# Patient Record
Sex: MALE | Race: BLACK OR AFRICAN AMERICAN | Employment: OTHER | URBAN - METROPOLITAN AREA
[De-identification: names, ages, dates, MRNs, and addresses within clinical notes are randomized per-mention and may not be internally consistent; named-entity substitution may affect disease eponyms.]

---

## 2017-02-06 ENCOUNTER — OFFICE VISIT (OUTPATIENT)
Dept: INTERNAL MEDICINE CLINIC | Age: 62
End: 2017-02-06

## 2017-02-06 VITALS
WEIGHT: 158 LBS | RESPIRATION RATE: 16 BRPM | BODY MASS INDEX: 22.62 KG/M2 | TEMPERATURE: 98.4 F | OXYGEN SATURATION: 98 % | HEIGHT: 70 IN | SYSTOLIC BLOOD PRESSURE: 144 MMHG | DIASTOLIC BLOOD PRESSURE: 94 MMHG | HEART RATE: 118 BPM

## 2017-02-06 DIAGNOSIS — R06.02 SOBOE (SHORTNESS OF BREATH ON EXERTION): ICD-10-CM

## 2017-02-06 DIAGNOSIS — J45.20 ASTHMA, MILD INTERMITTENT, WELL-CONTROLLED: ICD-10-CM

## 2017-02-06 DIAGNOSIS — Z71.89 ADVANCED CARE PLANNING/COUNSELING DISCUSSION: ICD-10-CM

## 2017-02-06 DIAGNOSIS — Z91.14 NONCOMPLIANCE WITH MEDICATION REGIMEN: ICD-10-CM

## 2017-02-06 DIAGNOSIS — I10 UNCONTROLLED HYPERTENSION: Primary | ICD-10-CM

## 2017-02-06 RX ORDER — ALBUTEROL SULFATE 90 UG/1
2 AEROSOL, METERED RESPIRATORY (INHALATION)
Qty: 1 INHALER | Refills: 0 | Status: SHIPPED | OUTPATIENT
Start: 2017-02-06 | End: 2017-10-12 | Stop reason: SDUPTHER

## 2017-02-06 RX ORDER — METOPROLOL TARTRATE 50 MG/1
TABLET ORAL
Qty: 60 TAB | Refills: 3 | Status: SHIPPED | OUTPATIENT
Start: 2017-02-06 | End: 2017-05-24 | Stop reason: SDUPTHER

## 2017-02-06 RX ORDER — AMLODIPINE BESYLATE 5 MG/1
TABLET ORAL
Qty: 30 TAB | Refills: 3 | Status: SHIPPED | OUTPATIENT
Start: 2017-02-06 | End: 2017-05-24 | Stop reason: SDUPTHER

## 2017-02-06 RX ORDER — LEVETIRACETAM 500 MG/1
TABLET ORAL
Qty: 60 TAB | Refills: 2 | Status: SHIPPED | OUTPATIENT
Start: 2017-02-06 | End: 2017-04-25 | Stop reason: SDUPTHER

## 2017-02-06 NOTE — PROGRESS NOTES
Chief Complaint   Patient presents with    Annual Wellness Visit       Subjective:   Amy Ordoñez 59 y.o.  male with a  past medical history reviewed see below. Here c/o breathing concerns and to recheck bp was booked for [address] - NOT DUE  Celester Shone like  an inhaler refill using it once or twice a day no coughing mild soboe  no cp mild coughing no fever or chills no bd pain   Knee pain is improved and swelling is better. ROS: otherwise feeling generally well. All other systems reviewed and are negative      Current Outpatient Prescriptions   Medication Sig Dispense Refill    albuterol (PROAIR HFA) 90 mcg/actuation inhaler Take 2 Puffs by inhalation every four (4) hours as needed for Wheezing or Shortness of Breath. 1 Inhaler 0    levETIRAcetam (KEPPRA) 500 mg tablet TAKE ONE TABLET BY MOUTH TWICE DAILY 60 Tab 2    amLODIPine (NORVASC) 5 mg tablet TAKE ONE TABLET BY MOUTH EVERY DAY 30 Tab 3    metoprolol tartrate (LOPRESSOR) 50 mg tablet TAKE ONE TABLET BY MOUTH TWICE DAILY. 60 Tab 3    traZODone (DESYREL) 100 mg tablet TAKE ONE TABLET BY MOUTH ONCE DAILY AT BEDTIME 30 Tab 0    therapeutic multivitamin (THERAGRAN) tablet Take 1 Tab by mouth daily.  30 Tab 3     Allergies   Allergen Reactions    Other Medication Hives     Ketchup(cheap brands) causes hives     Past Medical History:   Diagnosis Date    Depression     Hypertension      Past Surgical History:   Procedure Laterality Date    HX COLONOSCOPY  at age 44     Family History   Problem Relation Age of Onset    Alcohol abuse Mother     Alcohol abuse Father     Hypertension Sister      Social History   Substance Use Topics    Smoking status: Never Smoker    Smokeless tobacco: Never Used    Alcohol use 1.2 oz/week     2 Cans of beer per week      Comment: 6-8 cans of Lite beer per week          Objective:     Visit Vitals    BP (!) 144/94    Pulse (!) 118    Temp 98.4 °F (36.9 °C) (Oral)    Resp 16    Ht 5' 10\" (1.778 m)    Wt 158 lb (71.7 kg)  SpO2 98%    BMI 22.67 kg/m2     Gen: NAD, pleasant  HEENT: normal appearing head, nares patent, PERRLA, EOMI, oropharynx no erythema, no cervical lymphadenopathy neck supple   Cardio: RRR nl S1S2 no murmur  Lungs CTAB no wheeze no rales no rhonchi  ABD Soft non tender non distended + bowel sounds  Extremities: full ROM X 4 no clubbing no cyanosis  Neuro: no gross focal deficits noted, alert and orientated X 3  Psych.: well groomed no outward signs of depression. Assessment/Plan:   Nicole Chapman was seen today for annual wellness visit. Diagnoses and all orders for this visit:    Uncontrolled hypertension    Asthma, mild intermittent, well-controlled    Noncompliance with medication regimen    SOBOE (shortness of breath on exertion)    Advanced care planning/counseling discussion    Other orders  -     albuterol (PROAIR HFA) 90 mcg/actuation inhaler; Take 2 Puffs by inhalation every four (4) hours as needed for Wheezing or Shortness of Breath. -     levETIRAcetam (KEPPRA) 500 mg tablet; TAKE ONE TABLET BY MOUTH TWICE DAILY  -     amLODIPine (NORVASC) 5 mg tablet; TAKE ONE TABLET BY MOUTH EVERY DAY  -     metoprolol tartrate (LOPRESSOR) 50 mg tablet; TAKE ONE TABLET BY MOUTH TWICE DAILY. Follow-up Disposition:  Return in about 1 month (around 3/6/2017) for recheck BP 15 min . Prisca rodriguez printed and given to the pt. .    The patient voiced understanding of the above. Medication side effects were reviewed with the patient. Call with any concerns.                Advance Care Planning:   Patient was offered the opportunity to discuss advance care planning:  yes     Does patient have an Advance Directive:  no   Honoring choices discussed

## 2017-02-06 NOTE — MR AVS SNAPSHOT
Visit Information Date & Time Provider Department Dept. Phone Encounter #  
 2/6/2017  1:45 PM Vasquez Childress, [address] [facility] [phone] [id number] Follow-up Instructions Return in about 1 month (around 3/6/2017) for recheck BP 15 min . Upcoming Health Maintenance Date Due COLONOSCOPY 6/28/1971 Pneumococcal 19-64 Medium Risk (1 of 1 - PPSV23) 6/28/1972 DTaP/Tdap/Td series (1 - Tdap) 6/28/1974 ZOSTER VACCINE AGE 60> 6/28/2013 INFLUENZA AGE 9 TO ADULT 8/1/2016 MEDICARE YEARLY EXAM 8/6/2017 Allergies as of 2/6/2017  Review Complete On: 2/6/2017 By: She Velazquez LPN Severity Noted Reaction Type Reactions Other Medication  08/10/2011    Hives Ketchup(cheap brands) causes hives Current Immunizations  Reviewed on 8/5/2016 No immunizations on file. Not reviewed this visit You Were Diagnosed With   
  
 Codes Comments Uncontrolled hypertension    -  Primary ICD-10-CM: I10 
ICD-9-CM: 401.9 Asthma, mild intermittent, well-controlled     ICD-10-CM: J45.20 ICD-9-CM: 493.90 Noncompliance with medication regimen     ICD-10-CM: Z91.14 
ICD-9-CM: V15.81 SOBOE (shortness of breath on exertion)     ICD-10-CM: R06.02 
ICD-9-CM: 786.05 Advanced care planning/counseling discussion     ICD-10-CM: Z71.89 ICD-9-CM: V65.49 Vitals BP Pulse Temp Resp Height(growth percentile) Weight(growth percentile) (!) 144/94 (!) 118 98.4 °F (36.9 °C) (Oral) 16 5' 10\" (1.778 m) 158 lb (71.7 kg) SpO2 BMI Smoking Status 98% 22.67 kg/m2 Never Smoker BMI and BSA Data Body Mass Index Body Surface Area  
 22.67 kg/m 2 1.88 m 2 Preferred Pharmacy Pharmacy Name Phone [facility] [address], [address] [place] [address] [address]. [phone] Your Updated Medication List  
  
   
This list is accurate as of: 2/6/17  2:15 PM.  Always use your most recent med list.  
  
  
  
  
 albuterol 90 mcg/actuation inhaler Commonly known as:  PROAIR HFA Take 2 Puffs by inhalation every four (4) hours as needed for Wheezing or Shortness of Breath. amLODIPine 5 mg tablet Commonly known as:  [place] [facility] TAKE ONE TABLET BY MOUTH EVERY DAY  
  
 levETIRAcetam 500 mg tablet Commonly known as:  KEPPRA TAKE ONE TABLET BY MOUTH TWICE DAILY  
  
 metoprolol tartrate 50 mg tablet Commonly known as:  LOPRESSOR  
TAKE ONE TABLET BY MOUTH TWICE DAILY. therapeutic multivitamin tablet Commonly known as:  [facility] Take 1 Tab by mouth daily. traZODone 100 mg tablet Commonly known as:  DESYREL  
TAKE ONE TABLET BY MOUTH ONCE DAILY AT BEDTIME Prescriptions Sent to Pharmacy Refills  
 albuterol (PROAIR HFA) 90 mcg/actuation inhaler 0 Sig: Take 2 Puffs by inhalation every four (4) hours as needed for Wheezing or Shortness of Breath. Class: Normal  
 Pharmacy: [address] [place], [place] - [address] Ph #: [phone] Route: Inhalation  
 levETIRAcetam (KEPPRA) 500 mg tablet 2 Sig: TAKE ONE TABLET BY MOUTH TWICE DAILY Class: Normal  
 Pharmacy: [facility] [place], [place] - [address] Ph #: [phone]  
 amLODIPine (NORVASC) 5 mg tablet 3 Sig: TAKE ONE TABLET BY MOUTH EVERY DAY Class: Normal  
 Pharmacy: [address] [place], [place] - [address] Ph #: [phone]  
 metoprolol tartrate (LOPRESSOR) 50 mg tablet 3 Sig: TAKE ONE TABLET BY MOUTH TWICE DAILY. Class: Normal  
 Pharmacy: [address] [place], [place] - [address] Ph #: [phone] Follow-up Instructions Return in about 1 month (around 3/6/2017) for recheck BP 15 min . Introducing [facility]! Charo Harrison introduces [employer] patient portal. Now you can access parts of your medical record, email your doctor's office, and request medication refills online. 1. In your internet browser, go to [email or web address] 2. Click on the First Time User? Click Here link in the Sign In box. You will see the New Member Sign Up page. 3. Enter your [employer] Access Code exactly as it appears below. You will not need to use this code after youve completed the sign-up process. If you do not sign up before the expiration date, you must request a new code. · [employer] Access Code: [email or web address] Expires: 5/7/2017  2:15 PM 
 
4. Enter the last four digits of your Social Security Number (xxxx) and Date of Birth (mm/dd/yyyy) as indicated and click Submit. You will be taken to the next sign-up page. 5. Create a Jamgot ID. This will be your [employer] login ID and cannot be changed, so think of one that is secure and easy to remember. 6. Create a [employer] password. You can change your password at any time. 7. Enter your Password Reset Question and Answer. This can be used at a later time if you forget your password. 8. Enter your e-mail address. You will receive e-mail notification when new information is available in [address] 9. Click Sign Up. You can now view and download portions of your medical record. 10. Click the Download Summary menu link to download a portable copy of your medical information. If you have questions, please visit the Frequently Asked Questions section of the [employer] website. Remember, [employer] is NOT to be used for urgent needs. For medical emergencies, dial 911. Now available from your iPhone and Android! Please provide this summary of care documentation to your next provider. Your primary care clinician is listed as Gloria Schultz. If you have any questions after today's visit, please call [phone].

## 2017-02-06 NOTE — ACP (ADVANCE CARE PLANNING)
I met with the patient discuss advanced medical directives (AMD). The meeting was held at my  office. I discussed the advantages to completing the AMD through a facilitated discussion with his chosen healthcare agent as well as criteria to consider when selecting an agent. The patient was agreeable to receiving and reviewing Honoring Choices written information. And is planning to discuss advanced directives during the next office visit . Handouts given to pt on honoring choices tube feeding cpr ventilation support and how to choose a health care agent. Would likely be his sister garett armstrong    Time spent with pt   6 min     Denia Castro M.D.   Family Medicine -honoring choice facilitator

## 2017-05-09 RX ORDER — ALBUTEROL SULFATE 90 UG/1
AEROSOL, METERED RESPIRATORY (INHALATION)
OUTPATIENT
Start: 2017-05-09

## 2017-05-10 RX ORDER — LEVETIRACETAM 500 MG/1
TABLET ORAL
Qty: 60 TAB | Refills: 3 | Status: SHIPPED | OUTPATIENT
Start: 2017-05-10 | End: 2017-10-12 | Stop reason: SDUPTHER

## 2017-05-31 RX ORDER — METOPROLOL TARTRATE 50 MG/1
TABLET ORAL
Qty: 60 TAB | Refills: 3 | Status: SHIPPED | OUTPATIENT
Start: 2017-05-31 | End: 2017-10-12 | Stop reason: SDUPTHER

## 2017-05-31 RX ORDER — AMLODIPINE BESYLATE 5 MG/1
TABLET ORAL
Qty: 30 TAB | Refills: 3 | Status: SHIPPED | OUTPATIENT
Start: 2017-05-31 | End: 2017-10-12 | Stop reason: SDUPTHER

## 2017-07-07 ENCOUNTER — PATIENT OUTREACH (OUTPATIENT)
Dept: INTERNAL MEDICINE CLINIC | Age: 62
End: 2017-07-07

## 2017-10-12 ENCOUNTER — OFFICE VISIT (OUTPATIENT)
Dept: INTERNAL MEDICINE CLINIC | Age: 62
End: 2017-10-12

## 2017-10-12 VITALS
HEART RATE: 63 BPM | TEMPERATURE: 97.9 F | SYSTOLIC BLOOD PRESSURE: 138 MMHG | RESPIRATION RATE: 18 BRPM | WEIGHT: 151.4 LBS | HEIGHT: 70 IN | OXYGEN SATURATION: 97 % | DIASTOLIC BLOOD PRESSURE: 84 MMHG | BODY MASS INDEX: 21.67 KG/M2

## 2017-10-12 DIAGNOSIS — J45.20 ASTHMA, MILD INTERMITTENT, WELL-CONTROLLED: ICD-10-CM

## 2017-10-12 DIAGNOSIS — F10.10 ETOH ABUSE: ICD-10-CM

## 2017-10-12 DIAGNOSIS — I10 ESSENTIAL HYPERTENSION: ICD-10-CM

## 2017-10-12 DIAGNOSIS — J45.909 MILD ASTHMA WITHOUT COMPLICATION, UNSPECIFIED WHETHER PERSISTENT: ICD-10-CM

## 2017-10-12 DIAGNOSIS — R56.9 CONVULSIONS, UNSPECIFIED CONVULSION TYPE (HCC): ICD-10-CM

## 2017-10-12 DIAGNOSIS — R35.1 NOCTURIA: ICD-10-CM

## 2017-10-12 DIAGNOSIS — I49.3 PVC (PREMATURE VENTRICULAR CONTRACTION): ICD-10-CM

## 2017-10-12 DIAGNOSIS — Z13.31 SCREENING FOR DEPRESSION: ICD-10-CM

## 2017-10-12 DIAGNOSIS — R25.1 TREMORS OF NERVOUS SYSTEM: ICD-10-CM

## 2017-10-12 DIAGNOSIS — Z13.39 SCREENING FOR ALCOHOLISM: ICD-10-CM

## 2017-10-12 DIAGNOSIS — Z00.00 MEDICARE ANNUAL WELLNESS VISIT, SUBSEQUENT: Primary | ICD-10-CM

## 2017-10-12 DIAGNOSIS — R79.9 ABNORMAL FINDING OF BLOOD CHEMISTRY: ICD-10-CM

## 2017-10-12 RX ORDER — TRAZODONE HYDROCHLORIDE 100 MG/1
100 TABLET ORAL
Qty: 30 TAB | Refills: 11 | Status: SHIPPED | OUTPATIENT
Start: 2017-10-12 | End: 2018-10-05 | Stop reason: SDUPTHER

## 2017-10-12 RX ORDER — AMLODIPINE BESYLATE 5 MG/1
5 TABLET ORAL DAILY
Qty: 30 TAB | Refills: 11 | Status: SHIPPED | OUTPATIENT
Start: 2017-10-12 | End: 2018-10-05 | Stop reason: SDUPTHER

## 2017-10-12 RX ORDER — METOPROLOL TARTRATE 50 MG/1
50 TABLET ORAL 2 TIMES DAILY
Qty: 60 TAB | Refills: 11 | Status: SHIPPED | OUTPATIENT
Start: 2017-10-12 | End: 2018-10-05 | Stop reason: SDUPTHER

## 2017-10-12 RX ORDER — ALBUTEROL SULFATE 90 UG/1
2 AEROSOL, METERED RESPIRATORY (INHALATION)
Qty: 1 INHALER | Refills: 11 | Status: SHIPPED | OUTPATIENT
Start: 2017-10-12 | End: 2018-11-26 | Stop reason: SDUPTHER

## 2017-10-12 RX ORDER — LEVETIRACETAM 500 MG/1
500 TABLET ORAL 2 TIMES DAILY
Qty: 60 TAB | Refills: 11 | Status: SHIPPED | OUTPATIENT
Start: 2017-10-12 | End: 2018-11-26 | Stop reason: SDUPTHER

## 2017-10-12 NOTE — PROGRESS NOTES
1. Have you been to the ER, urgent care clinic since your last visit? Hospitalized since your last visit? No    2. Have you seen or consulted any other health care providers outside of the [address] [place] [address] since your last visit? Include any pap smears or colon screening. No    This is a Subsequent Medicare Annual Wellness Exam (AWV) (Performed 12 months after IPPE or effective date of Medicare Part B enrollment, Once in a lifetime)    I have reviewed the patient's medical history in detail and updated the computerized patient record. History     Past Medical History:   Diagnosis Date    Depression     Hypertension       Past Surgical History:   Procedure Laterality Date    HX COLONOSCOPY  at age 44     Current Outpatient Prescriptions   Medication Sig Dispense Refill    metoprolol tartrate (LOPRESSOR) 50 mg tablet Take 1 Tab by mouth two (2) times a day. 60 Tab 11    amLODIPine (NORVASC) 5 mg tablet Take 1 Tab by mouth daily. 30 Tab 11    levETIRAcetam (KEPPRA) 500 mg tablet Take 1 Tab by mouth two (2) times a day. 60 Tab 11    albuterol (PROAIR HFA) 90 mcg/actuation inhaler Take 2 Puffs by inhalation every four (4) hours as needed for Wheezing or Shortness of Breath. 1 Inhaler 11    traZODone (DESYREL) 100 mg tablet Take 1 Tab by mouth nightly. 30 Tab 11    therapeutic multivitamin (THERAGRAN) tablet Take 1 Tab by mouth daily.  30 Tab 3     Allergies   Allergen Reactions    Other Medication Hives     Ketchup(cheap brands) causes hives     Family History   Problem Relation Age of Onset    Alcohol abuse Mother     Alcohol abuse Father     Hypertension Sister      Social History   Substance Use Topics    Smoking status: Never Smoker    Smokeless tobacco: Never Used    Alcohol use 1.2 oz/week     2 Cans of beer per week      Comment: 6-8 cans of Lite beer per week     Patient Active Problem List   Diagnosis Code    Tremors of nervous system R25.1    ETOH abuse F10.10    Asthma J45.909  Benign essential HTN I10    Convulsions ([facility] [address] R56.9    Anemia, unspecified D64.9    Unspecified dementia without behavioral disturbance F03.90    Mild intermittent asthma without complication W41.86    Advance directive discussed with patient Z70.80    Advanced care planning/counseling discussion Z71.89    Asthma, mild intermittent, well-controlled J45.20    Hypertension I10    Depression F32.9       Depression Risk Factor Screening:     PHQ over the last two weeks 10/12/2017   Little interest or pleasure in doing things Not at all   Feeling down, depressed or hopeless Not at all   Total Score PHQ 2 0     Alcohol Risk Factor Screening: You do not drink alcohol or very rarely. On any occasion in the past three months you have had more than 7 drinks containing alcohol      Functional Ability and Level of Safety:   Hearing Loss  Hearing is good. Activities of Daily Living  The home contains: no safety equipment. Patient does total self care    Fall RiskNo flowsheet data found. Abuse Screen  Patient is not abused    Cognitive Screening   Evaluation of Cognitive Function:  Has your family/caregiver stated any concerns about your memory: no  Normal    Patient Care Team   Patient Care Team:  Jef Rivera MD as PCP - General (Family Practice)  Sandeep Jones LPN as Nurse Navigator  Zaheer Del Valle RN as Nurse Navigator    Assessment/Plan   Education and counseling provided:  Are appropriate based on today's review and evaluation    Diagnoses and all orders for this visit:    1. Medicare annual wellness visit, subsequent    2. Screening for alcoholism  -     Annual  Alcohol Screen 15 min ()    3. Screening for depression  -     Depression Screen Annual    4.  Essential hypertension  -     URINALYSIS W/ RFLX MICROSCOPIC  -     CBC WITH AUTOMATED DIFF  -     METABOLIC PANEL, COMPREHENSIVE  -     LIPID PANEL  -     PROSTATE SPECIFIC AG  -     AK COLLECTION VENOUS BLOOD,VENIPUNCTURE  - HEMOGLOBIN A1C WITH EAG  -     AMMONIA  -     AMB POC EKG ROUTINE W/ 12 LEADS, INTER & REP  -     OCCULT BLOOD, IMMUNOASSAY (FIT)    5. Convulsions, unspecified convulsion type ([address]    6. Asthma, mild intermittent, well-controlled    7. Mild asthma without complication, unspecified whether persistent    8. Tremors of nervous system    9. ETOH abuse  -     AMMONIA    10. Nocturia   -     PROSTATE SPECIFIC AG    11. Abnormal finding of blood chemistry   -     HEMOGLOBIN A1C WITH EAG    Other orders  -     metoprolol tartrate (LOPRESSOR) 50 mg tablet; Take 1 Tab by mouth two (2) times a day. -     amLODIPine (NORVASC) 5 mg tablet; Take 1 Tab by mouth daily. -     levETIRAcetam (KEPPRA) 500 mg tablet; Take 1 Tab by mouth two (2) times a day. -     albuterol (PROAIR HFA) 90 mcg/actuation inhaler; Take 2 Puffs by inhalation every four (4) hours as needed for Wheezing or Shortness of Breath. -     traZODone (DESYREL) 100 mg tablet; Take 1 Tab by mouth nightly. Advance Care Planning (ACP) Provider Conversation Snapshot    Date of ACP Conversation: 10/12/17  Persons included in Conversation:  patient  Length of ACP Conversation in minutes:  <16 minutes (Non-Billable)    Authorized Decision Maker (if patient is incapable of making informed decisions):    This person is:   Healthcare Agent/Medical Power of  under Advance Directive  Grace Shelby        For Patients with Decision Making Capacity:   Values/Goals: Exploration of values, goals, and preferences if recovery is not expected, even with continued medical treatment in the event of:  Imminent death  Severe, permanent brain injury    Conversation Outcomes / Follow-Up Plan:   Recommended completion of Advance Directive form after review of ACP materials and conversation with prospective healthcare agent     [address], [place] [address] [place] [address],[address] [address]  Phone:  [phone]  Fax: [phone]    [facility] Complaint   Patient presents with    Annual Wellness Visit       SUBJECTIVE:    Terry Dasilva is a 57 y.o. male Patient is alert, appropriate, ambulatory and has the capacity to give an accurate history. He comes in with his sister, Betsey Essex, whose cell number is [phone]. He wants a check of his blood pressure and complete his annual Medicare exam.  Other specific complaints denied. Patient is seen for evaluation. Current Outpatient Prescriptions   Medication Sig Dispense Refill    metoprolol tartrate (LOPRESSOR) 50 mg tablet Take 1 Tab by mouth two (2) times a day. 60 Tab 11    amLODIPine (NORVASC) 5 mg tablet Take 1 Tab by mouth daily. 30 Tab 11    levETIRAcetam (KEPPRA) 500 mg tablet Take 1 Tab by mouth two (2) times a day. 60 Tab 11    albuterol (PROAIR HFA) 90 mcg/actuation inhaler Take 2 Puffs by inhalation every four (4) hours as needed for Wheezing or Shortness of Breath. 1 Inhaler 11    traZODone (DESYREL) 100 mg tablet Take 1 Tab by mouth nightly. 30 Tab 11    therapeutic multivitamin (THERAGRAN) tablet Take 1 Tab by mouth daily.  30 Tab 3     Past Medical History:   Diagnosis Date    Depression     Hypertension      Past Surgical History:   Procedure Laterality Date    HX COLONOSCOPY  at age 44     Allergies   Allergen Reactions    Other Medication Hives     Ketchup(cheap brands) causes hives       REVIEW OF SYSTEMS:  General: negative for - chills or fever  ENT: negative for - headaches, nasal congestion or tinnitus  Respiratory: negative for - cough, hemoptysis, shortness of breath or wheezing  Cardiovascular : negative for - chest pain, edema, palpitations or shortness of breath  Gastrointestinal: negative for - abdominal pain, blood in stools, heartburn or nausea/vomiting  Genito-Urinary: no dysuria, trouble voiding, or hematuria  Musculoskeletal: negative for - gait disturbance, joint pain, joint stiffness or joint swelling  Neurological: no TIA or stroke symptoms  Hematologic: no bruises, no bleeding, no swollen glands  Integument: no lumps, mole changes, nail changes or rash  Endocrine:no malaise/lethargy or unexpected weight changes      Social History     Social History    Marital status: SINGLE     Spouse name: N/A    Number of children: 1    Years of education: N/A     Occupational History          on disbiltiy arthritis and depression      Social History Main Topics    Smoking status: Never Smoker    Smokeless tobacco: Never Used    Alcohol use 1.2 oz/week     2 Cans of beer per week      Comment: 6-8 cans of Lite beer per week    Drug use: No      Comment: denies     Sexual activity: Yes     Partners: Female     Birth control/ protection: Condom, None      Comment: sister is CHRISTEN Woodruff [phone]     Other Topics Concern    None     Social History Narrative     Family History   Problem Relation Age of Onset    Alcohol abuse Mother     Alcohol abuse Father     Hypertension Sister    Habits:  He states he only drinks one to two beers a day, however his sister cannot confirm that. She states he has gin plus a 20 ounce beer on a regular basis. The records reflect a long history of alcoholism. He denies cigarette abuse and drug abuse. Social History:  The patient states with his brother with [facility] checking on both of them and making sure he takes his medications and gets his appointments. He completed the 12th grade. He is disabled due to depression since . He has a 39year old daughter and three grandchildren. The patient is single. He previously worked in the kitchen at [employer].    Family History:  Father  in his 45s of alcohol abuse. Mother  in her 62s of alcohol abuse. Eight siblings are alive and well. One sister  of alcohol and its complications.         OBJECTIVE:     Visit Vitals    /84    Pulse 63    Temp 97.9 °F (36.6 °C) (Oral)    Resp 18    Ht 5' 10\" (1.778 m)    Wt 151 lb 6.4 oz (68.7 kg)    SpO2 97%    BMI 21.72 kg/m2     CONSTITUTIONAL: well , well nourished, appears age appropriate  EYES: perrla, eom intact  ENMT:moist mucous membranes, pharynx clear  NECK: supple. Thyroid normal  RESPIRATORY: Chest: clear bilaterally  CARDIOVASCULAR: Heart: regular rate and rhythm  GASTROINTESTINAL: Abdomen: soft, bowel sounds active  HEMATOLOGIC: no pathological lymph nodes palpated  MUSCULOSKELETAL: Extremities: no edema, pulse 1+   INTEGUMENT: No unusual rashes or suspicious skin lesions noted. Nails appear normal.  NEUROLOGIC: non-focal exam   MENTAL STATUS: alert and oriented, appropriate affect     No visits with results within 3 Month(s) from this visit. Latest known visit with results is:    Office Visit on 08/05/2016   Component Date Value Ref Range Status    Hepatitis A Ab, IgM 08/05/2016 Negative  Negative Final    Hep B surface Ag screen 08/05/2016 Negative  Negative Final    Hep B Core Ab, IgM 08/05/2016 Negative  Negative Final    Hep C Virus Ab 08/05/2016 <0.1  0.0 - 0.9 s/co ratio Final    Comment:                                   Negative:     < 0.8                               Indeterminate: 0.8 - 0.9                                    Positive:     > 0.9   The CDC recommends that a positive HCV antibody result   be followed up with a HCV Nucleic Acid Amplification   test (186966).       TIBC 08/05/2016 271  250 - 450 ug/dL Final    UIBC 08/05/2016 149  111 - 343 ug/dL Final    Iron 08/05/2016 122  38 - 169 ug/dL Final    Iron % saturation 08/05/2016 45  15 - 55 % Final    WBC 08/05/2016 3.8  3.4 - 10.8 x10E3/uL Final    RBC 08/05/2016 3.72* 4.14 - 5.80 x10E6/uL Final    HGB 08/05/2016 11.2* 12.6 - 17.7 g/dL Final    HCT 08/05/2016 34.1* 37.5 - 51.0 % Final    MCV 08/05/2016 92  79 - 97 fL Final    MCH 08/05/2016 30.1  26.6 - 33.0 pg Final    MCHC 08/05/2016 32.8  31.5 - 35.7 g/dL Final    RDW 08/05/2016 15.7* 12.3 - 15.4 % Final    PLATELET 26/66/8030 334  150 - 379 x10E3/uL Final    NEUTROPHILS 08/05/2016 37  % Final    Lymphocytes 08/05/2016 43  % Final    MONOCYTES 08/05/2016 14  % Final    EOSINOPHILS 08/05/2016 5  % Final    BASOPHILS 08/05/2016 1  % Final    ABS. NEUTROPHILS 08/05/2016 1.4  1.4 - 7.0 x10E3/uL Final    Abs Lymphocytes 08/05/2016 1.6  0.7 - 3.1 x10E3/uL Final    ABS. MONOCYTES 08/05/2016 0.5  0.1 - 0.9 x10E3/uL Final    ABS. EOSINOPHILS 08/05/2016 0.2  0.0 - 0.4 x10E3/uL Final    ABS. BASOPHILS 08/05/2016 0.1  0.0 - 0.2 x10E3/uL Final    IMMATURE GRANULOCYTES 08/05/2016 0  % Final    ABS. IMM. GRANS. 08/05/2016 0.0  0.0 - 0.1 x10E3/uL Final    Reticulocyte count 08/05/2016 0.9  0.6 - 2.6 % Final    VITAMIN D, 25-HYDROXY 08/05/2016 26.2* 30.0 - 100.0 ng/mL Final    Comment: Vitamin D deficiency has been defined by the [facility] practice guideline as a  level of serum 25-OH vitamin D less than 20 ng/mL (1,2). The Endocrine Society went on to further define vitamin D  insufficiency as a level between 21 and 29 ng/mL (2). 1. IOM ([employer]). 2010. Dietary reference     intakes for calcium and D. [address] [facility]: The     [employer] 2. Gaurav MF, Miguel A NC, Daniel KITCHEN, et al.     Evaluation, treatment, and prevention of vitamin D     deficiency: an Endocrine Society clinical practice     guideline. JCEM. 2011 Jul; 96(7):1911-30.       Glucose 08/05/2016 84  65 - 99 mg/dL Final    BUN 08/05/2016 7* 8 - 27 mg/dL Final    Creatinine 08/05/2016 0.83  0.76 - 1.27 mg/dL Final    GFR est non-AA 08/05/2016 94  >59 mL/min/1.73 Final    GFR est AA 08/05/2016 108  >59 mL/min/1.73 Final    BUN/Creatinine ratio 08/05/2016 8* 10 - 22 Final    Sodium 08/05/2016 140  134 - 144 mmol/L Final    Potassium 08/05/2016 4.6  3.5 - 5.2 mmol/L Final    Chloride 08/05/2016 95* 97 - 108 mmol/L Final    CO2 08/05/2016 23  18 - 29 mmol/L Final    Calcium 08/05/2016 9.4  8.6 - 10.2 mg/dL Final    Protein, total 08/05/2016 8.4  6.0 - 8.5 g/dL Final    Albumin 08/05/2016 4.1  3.6 - 4.8 g/dL Final    GLOBULIN, TOTAL 08/05/2016 4.3  1.5 - 4.5 g/dL Final    A-G Ratio 08/05/2016 1.0* 1.1 - 2.5 Final    Bilirubin, total 08/05/2016 0.5  0.0 - 1.2 mg/dL Final    Alk. phosphatase 08/05/2016 73  39 - 117 IU/L Final    AST (SGOT) 08/05/2016 89* 0 - 40 IU/L Final    ALT (SGPT) 08/05/2016 40  0 - 44 IU/L Final       ASSESSMENT:   1. Medicare annual wellness visit, subsequent    2. Screening for alcoholism    3. Screening for depression    4. Essential hypertension    5. Convulsions, unspecified convulsion type ([facility] [address]    6. Asthma, mild intermittent, well-controlled    7. Mild asthma without complication, unspecified whether persistent    8. Tremors of nervous system    9. ETOH abuse    10. Nocturia     11. Abnormal finding of blood chemistry       Patient's medical status is currently stable. His blood pressure control is at goal on his current regimen. We strongly encouraged him to discontinue alcohol. He'd been in a program in the past at [facility] and received a certificate, but apparently fell off the wagon. We suggest he can do it again if he has the desire to do so. I remind him that his family dies in their 62s of this disease. We encouraged him to be physically active 30 minutes five days a week. We asked him to come back and see us about every 3-4 months and we'll see how he's doing. A colonoscopy was performed about seven years ago, Stefanie Samuels tells me, at [facility]. We will get that record. We advised him that if he has urgency and cannot get an appointment or can't get through to the office, he can walk in and we will see him without an appointment.         PLAN:  .  Orders Placed This Encounter    Depression Screen Annual    URINALYSIS W/ RFLX MICROSCOPIC    CBC WITH AUTOMATED DIFF    METABOLIC PANEL, COMPREHENSIVE    LIPID PANEL    PROSTATE SPECIFIC AG    HEMOGLOBIN A1C WITH EAG    AMMONIA    OCCULT BLOOD, IMMUNOASSAY (FIT)    AMB POC EKG ROUTINE W/ 12 LEADS, INTER & REP    metoprolol tartrate (LOPRESSOR) 50 mg tablet    amLODIPine (NORVASC) 5 mg tablet    levETIRAcetam (KEPPRA) 500 mg tablet    albuterol (PROAIR HFA) 90 mcg/actuation inhaler    traZODone (DESYREL) 100 mg tablet       Follow-up Disposition:  Return in about 6 months (around 4/12/2018). ATTENTION:   This medical record was transcribed using an electronic medical records system. Although proofread, it may and can contain electronic and spelling errors. Other human spelling and other errors may be present. Corrections may be executed at a later time. Please feel free to contact us for any clarifications as needed.     Health Maintenance Due   Topic Date Due    COLONOSCOPY  06/28/1971    Pneumococcal 19-64 Medium Risk (1 of 1 - PPSV23) 06/28/1972    DTaP/Tdap/Td series (1 - Tdap) 06/28/1974    ZOSTER VACCINE AGE 60>  04/28/2013    MEDICARE YEARLY EXAM  08/06/2017

## 2017-10-12 NOTE — PATIENT INSTRUCTIONS

## 2017-10-12 NOTE — MR AVS SNAPSHOT
Visit Information Date & Time Provider Department Dept. Phone Encounter #  
 10/12/2017 11:00 AM Cici Riggins [facility] Sports Medicine and [place] [address] [id number] Follow-up Instructions Return in about 6 months (around 4/12/2018). Follow-up and Disposition History Upcoming Health Maintenance Date Due COLONOSCOPY 6/28/1971 Pneumococcal 19-64 Medium Risk (1 of 1 - PPSV23) 6/28/1972 DTaP/Tdap/Td series (1 - Tdap) 6/28/1974 ZOSTER VACCINE AGE 60> 4/28/2013 MEDICARE YEARLY EXAM 8/6/2017 Allergies as of 10/12/2017  Review Complete On: 10/12/2017 By: Mary Howard MD  
  
 Severity Noted Reaction Type Reactions Other Medication  08/10/2011    Hives Ketchup(cheap brands) causes hives Current Immunizations  Reviewed on 8/5/2016 No immunizations on file. Not reviewed this visit You Were Diagnosed With   
  
 Codes Comments Medicare annual wellness visit, subsequent    -  Primary ICD-10-CM: Z00.00 ICD-9-CM: V70.0 Screening for alcoholism     ICD-10-CM: Z13.89 ICD-9-CM: V79.1 Screening for depression     ICD-10-CM: Z13.89 ICD-9-CM: V79.0 Essential hypertension     ICD-10-CM: I10 
ICD-9-CM: 401.9 Convulsions, unspecified convulsion type ([facility]     ICD-10-CM: R56.9 ICD-9-CM: 780.39 Asthma, mild intermittent, well-controlled     ICD-10-CM: J45.20 ICD-9-CM: 493.90 Mild asthma without complication, unspecified whether persistent     ICD-10-CM: J45.909 ICD-9-CM: 493.90 Tremors of nervous system     ICD-10-CM: R25.1 ICD-9-CM: 781.0 ETOH abuse     ICD-10-CM: F10.10 ICD-9-CM: 305.00 Nocturia     ICD-10-CM: R35.1 ICD-9-CM: 788.43 Abnormal finding of blood chemistry     ICD-10-CM: R79.9 ICD-9-CM: 790.6 PVC (premature ventricular contraction)     ICD-10-CM: I49.3 ICD-9-CM: 427.69 Vitals BP Pulse Temp Resp Height(growth percentile) Weight(growth percentile) 138/84 63 97.9 °F (36.6 °C) (Oral) 18 5' 10\" (1.778 m) 151 lb 6.4 oz (68.7 kg) SpO2 BMI Smoking Status 97% 21.72 kg/m2 Never Smoker BMI and BSA Data Body Mass Index Body Surface Area 21.72 kg/m 2 1.84 m 2 Preferred Pharmacy Pharmacy Name Phone 55 [facility], [address] [place] [address] DELANEY Guo. [phone] Your Updated Medication List  
  
   
This list is accurate as of: 10/12/17 12:19 PM.  Always use your most recent med list.  
  
  
  
  
 albuterol 90 mcg/actuation inhaler Commonly known as:  PROAIR HFA Take 2 Puffs by inhalation every four (4) hours as needed for Wheezing or Shortness of Breath. amLODIPine 5 mg tablet Commonly known as:  Ekaterina Citron Take 1 Tab by mouth daily. levETIRAcetam 500 mg tablet Commonly known as:  KEPPRA Take 1 Tab by mouth two (2) times a day. metoprolol tartrate 50 mg tablet Commonly known as:  LOPRESSOR Take 1 Tab by mouth two (2) times a day. therapeutic multivitamin tablet Commonly known as:  [facility] Take 1 Tab by mouth daily. traZODone 100 mg tablet Commonly known as:  Rashaun Morales Take 1 Tab by mouth nightly. Prescriptions Sent to Pharmacy Refills  
 metoprolol tartrate (LOPRESSOR) 50 mg tablet 11 Sig: Take 1 Tab by mouth two (2) times a day. Class: Normal  
 Pharmacy: 1 [place], [place] - [address] Ph #: [phone] Route: Oral  
 amLODIPine (NORVASC) 5 mg tablet 11 Sig: Take 1 Tab by mouth daily. Class: Normal  
 Pharmacy: 1 [place], [place] - [address]. Ph #: [phone] Route: Oral  
 levETIRAcetam (KEPPRA) 500 mg tablet 11 Sig: Take 1 Tab by mouth two (2) times a day. Class: Normal  
 Pharmacy: 1 [place], [place] - [address]. Ph #: [phone]  Route: Oral  
 albuterol (PROAIR HFA) 90 mcg/actuation inhaler 11  
 Sig: Take 2 Puffs by inhalation every four (4) hours as needed for Wheezing or Shortness of Breath. Class: Normal  
 Pharmacy: 1 [facility], [address] [facility] [address] [facility]. Ph #: [phone] Route: Inhalation  
 traZODone (DESYREL) 100 mg tablet 11 Sig: Take 1 Tab by mouth nightly. Class: Normal  
 Pharmacy: 1 [facility], [address] [facility] [address] [facility]. Ph #: [phone] Route: Oral  
  
We Performed the Following AMB POC EKG ROUTINE W/ 12 LEADS, INTER & REP [36223 CPT(R)] AMMONIA [id number] CPT(R)] CBC WITH AUTOMATED DIFF [99373 CPT(R)] [address] [[id number] HCPCS] HEMOGLOBIN A1C WITH EAG [89623 CPT(R)] LIPID PANEL [45342 CPT(R)] METABOLIC PANEL, COMPREHENSIVE [94653 CPT(R)] OCCULT BLOOD, IMMUNOASSAY (FIT) [id number] CPT(R)] AR ANNUAL ALCOHOL SCREEN 15 MIN [id number] HCPCS] AR COLLECTION VENOUS BLOOD,VENIPUNCTURE [id number] CPT(R)] PSA, DIAGNOSTIC (PROSTATE SPECIFIC AG) [id number] CPT(R)] URINALYSIS W/ RFLX MICROSCOPIC [08294 CPT(R)] Follow-up Instructions Return in about 6 months (around 4/12/2018). To-Do List   
 10/12/2017 ECHO:  2D ECHO COMPLETE ADULT (TTE) W OR WO CONTR   
  
 10/12/2017 ECG:  CARDIAC HOLTER MONITOR, 24 HOURS Patient Instructions Medicare Wellness Visit, Male The best way to live healthy is to have a healthy lifestyle by eating a well-balanced diet, exercising regularly, limiting alcohol and stopping smoking. Regular physical exams and screening tests are another way to keep healthy. Preventive exams provided by your health care provider can find health problems before they become diseases or illnesses. Preventive services including immunizations, screening tests, monitoring and exams can help you take care of your own health. All people over age 72 should have a pneumovax  and and a prevnar shot to prevent pneumonia.  These are once in a lifetime unless you and your provider decide differently. All people over 65 should have a yearly flu shot and a tetanus vaccine every 10 years. Screening for diabetes mellitus with a blood sugar test should be done every year. Glaucoma is a disease of the eye due to increased ocular pressure that can lead to blindness and it should be done every year by an eye professional. 
 
Cardiovascular screening tests that check for elevated lipids (fatty part of blood) which can lead to heart disease and strokes should be done every 5 years. Colorectal screening that evaluates for blood or polyps in your colon should be done yearly as a stool test or every five years as a flexible sigmoidoscope or every 10 years as a colonoscopy up to age 76. Men up to age 76 may need a screening blood test for prostate cancer at certain intervals, depending on their personal and family history. This decision is between the patient and his provider. If you have been a smoker or had family history of abdominal aortic aneurysms, you and your provider may decide to schedule an ultrasound test of your aorta. Hepatitis C screening is also recommended for anyone born between [birth year] through [address]. A shingles vaccine is also recommended once in a lifetime after age 61. Your Medicare Wellness Exam is recommended annually. Here is a list of your current Health Maintenance items with a due date: 
Health Maintenance Due Topic Date Due  
 Colonoscopy  06/28/1971  Pneumococcal Vaccine (1 of 1 - PPSV23) 06/28/1972  
 DTaP/Tdap/Td  (1 - Tdap) 06/28/1974  Shingles Vaccine  04/28/2013 Herb Crocker Annual Well Visit  08/06/2017 Introducing \[facility]\"" & HEALTH SERVICES! [facility] introduces [employer] patient portal. Now you can access parts of your medical record, email your doctor's office, and request medication refills online. 1. In your internet browser, go to [email or web address] 2. Click on the First Time User? Click Here link in the Sign In box. You will see the New Member Sign Up page. 3. Enter your Shhmooze Access Code exactly as it appears below. You will not need to use this code after youve completed the sign-up process. If you do not sign up before the expiration date, you must request a new code. · Shhmooze Access Code: [email or web address] Expires: 1/10/2018 12:19 PM 
 
4. Enter the last four digits of your Social Security Number (xxxx) and Date of Birth (mm/dd/yyyy) as indicated and click Submit. You will be taken to the next sign-up page. 5. Create a Shhmooze ID. This will be your Shhmooze login ID and cannot be changed, so think of one that is secure and easy to remember. 6. Create a Shhmooze password. You can change your password at any time. 7. Enter your Password Reset Question and Answer. This can be used at a later time if you forget your password. 8. Enter your e-mail address. You will receive e-mail notification when new information is available in [address] 9. Click Sign Up. You can now view and download portions of your medical record. 10. Click the Download Summary menu link to download a portable copy of your medical information. If you have questions, please visit the Frequently Asked Questions section of the Shhm[employer]ze website. Remember, Shhmooze is NOT to be used for urgent needs. For medical emergencies, dial 911. Now available from your iPhone and Android! Please provide this summary of care documentation to your next provider. Your primary care clinician is listed as Juan Abbott. If you have any questions after today's visit, please call [phone].

## 2017-10-13 LAB
ALBUMIN SERPL-MCNC: 4 G/DL (ref 3.6–4.8)
ALBUMIN/GLOB SERPL: 1 {RATIO} (ref 1.2–2.2)
ALP SERPL-CCNC: 92 IU/L (ref 39–117)
ALT SERPL-CCNC: 36 IU/L (ref 0–44)
AST SERPL-CCNC: 97 IU/L (ref 0–40)
BASOPHILS # BLD AUTO: 0 X10E3/UL (ref 0–0.2)
BASOPHILS NFR BLD AUTO: 1 %
BILIRUB SERPL-MCNC: 0.7 MG/DL (ref 0–1.2)
BUN SERPL-MCNC: 5 MG/DL (ref 8–27)
BUN/CREAT SERPL: 5 (ref 10–24)
CALCIUM SERPL-MCNC: 9.7 MG/DL (ref 8.6–10.2)
CHLORIDE SERPL-SCNC: 90 MMOL/L (ref 96–106)
CHOLEST SERPL-MCNC: 219 MG/DL (ref 100–199)
CO2 SERPL-SCNC: 25 MMOL/L (ref 18–29)
CREAT SERPL-MCNC: 1 MG/DL (ref 0.76–1.27)
EOSINOPHIL # BLD AUTO: 0.1 X10E3/UL (ref 0–0.4)
EOSINOPHIL NFR BLD AUTO: 2 %
ERYTHROCYTE [DISTWIDTH] IN BLOOD BY AUTOMATED COUNT: 14.8 % (ref 12.3–15.4)
EST. AVERAGE GLUCOSE BLD GHB EST-MCNC: 100 MG/DL
GLOBULIN SER CALC-MCNC: 4.2 G/DL (ref 1.5–4.5)
GLUCOSE SERPL-MCNC: 101 MG/DL (ref 65–99)
HBA1C MFR BLD: 5.1 % (ref 4.8–5.6)
HCT VFR BLD AUTO: 31.5 % (ref 37.5–51)
HDLC SERPL-MCNC: 118 MG/DL
HGB BLD-MCNC: 10.7 G/DL (ref 12.6–17.7)
IMM GRANULOCYTES # BLD: 0 X10E3/UL (ref 0–0.1)
IMM GRANULOCYTES NFR BLD: 0 %
LDLC SERPL CALC-MCNC: 88 MG/DL (ref 0–99)
LYMPHOCYTES # BLD AUTO: 2 X10E3/UL (ref 0.7–3.1)
LYMPHOCYTES NFR BLD AUTO: 36 %
MCH RBC QN AUTO: 30.7 PG (ref 26.6–33)
MCHC RBC AUTO-ENTMCNC: 34 G/DL (ref 31.5–35.7)
MCV RBC AUTO: 90 FL (ref 79–97)
MONOCYTES # BLD AUTO: 1.1 X10E3/UL (ref 0.1–0.9)
MONOCYTES NFR BLD AUTO: 20 %
MORPHOLOGY BLD-IMP: ABNORMAL
NEUTROPHILS # BLD AUTO: 2.2 X10E3/UL (ref 1.4–7)
NEUTROPHILS NFR BLD AUTO: 41 %
PLATELET # BLD AUTO: 175 X10E3/UL (ref 150–379)
POTASSIUM SERPL-SCNC: 4.6 MMOL/L (ref 3.5–5.2)
PROT SERPL-MCNC: 8.2 G/DL (ref 6–8.5)
PSA SERPL-MCNC: 0.8 NG/ML (ref 0–4)
RBC # BLD AUTO: 3.49 X10E6/UL (ref 4.14–5.8)
SODIUM SERPL-SCNC: 131 MMOL/L (ref 134–144)
TRIGL SERPL-MCNC: 64 MG/DL (ref 0–149)
VLDLC SERPL CALC-MCNC: 13 MG/DL (ref 5–40)
WBC # BLD AUTO: 5.4 X10E3/UL (ref 3.4–10.8)

## 2018-05-17 ENCOUNTER — OFFICE VISIT (OUTPATIENT)
Dept: INTERNAL MEDICINE CLINIC | Age: 63
End: 2018-05-17

## 2018-05-17 VITALS
HEIGHT: 70 IN | WEIGHT: 157.8 LBS | BODY MASS INDEX: 22.59 KG/M2 | TEMPERATURE: 97.8 F | HEART RATE: 54 BPM | OXYGEN SATURATION: 98 % | RESPIRATION RATE: 16 BRPM | SYSTOLIC BLOOD PRESSURE: 156 MMHG | DIASTOLIC BLOOD PRESSURE: 85 MMHG

## 2018-05-17 DIAGNOSIS — I10 ESSENTIAL HYPERTENSION: Primary | ICD-10-CM

## 2018-05-17 DIAGNOSIS — F10.10 ETOH ABUSE: ICD-10-CM

## 2018-05-17 DIAGNOSIS — D64.9 ANEMIA, UNSPECIFIED TYPE: ICD-10-CM

## 2018-05-17 NOTE — MR AVS SNAPSHOT
[address] [facility] 
 
 
 [address] [address] 
[phone] Patient: Christine Nguyễn MRN: [id number] :1955 Visit Information Date & Time Provider Department Dept. Phone Encounter #  
 2018 12:15 PM MD Anupam Tamayo [facility] Sports Medicine and Primary Care [phone] [id number] Follow-up Instructions Return in about 3 months (around 2018). Follow-up and Disposition History Your Appointments 2018  9:30 AM  
Any with Claudia Reich MD  
[address] [place] [address] and Primary Care [address] [facility]) Appt Note: FOLLOW UP  
 [address] [facility]  
  
   
 [address] [address] Upcoming Health Maintenance Date Due COLONOSCOPY 1971 GLAUCOMA SCREENING Q2Y 2018 Pneumococcal 65+ Low/Medium Risk (1 of 2 - PCV13) 2018 Influenza Age 5 to Adult 2018 ZOSTER VACCINE AGE 60> 2019* DTaP/Tdap/Td series (1 - Tdap) 2019* MEDICARE YEARLY EXAM 10/13/2018 *Topic was postponed. The date shown is not the original due date. Allergies as of 2018  Review Complete On: 2018 By: Claudia Reich MD  
  
 Severity Noted Reaction Type Reactions Other Medication  08/10/2011    Hives Ketchup(cheap brands) causes hives Current Immunizations  Reviewed on 2016 No immunizations on file. Not reviewed this visit You Were Diagnosed With   
  
 Codes Comments Essential hypertension    -  Primary ICD-10-CM: I10 
ICD-9-CM: 401.9 ETOH abuse     ICD-10-CM: F10.10 ICD-9-CM: 305.00 Anemia, unspecified type     ICD-10-CM: D64.9 ICD-9-CM: 488. 9 Vitals BP Pulse Temp Resp Height(growth percentile) Weight(growth percentile) 156/85 (!) 54 97.8 °F (36.6 °C) (Oral) 16 5' 10\" (1.778 m) 157 lb 12.8 oz (71.6 kg) SpO2 BMI Smoking Status 98% 22.64 kg/m2 Never Smoker Vitals History BMI and BSA Data Body Mass Index Body Surface Area  
 22.64 kg/m 2 1.88 m 2 Preferred Pharmacy Pharmacy Name Phone Ovidio OLSEN [phone] Your Updated Medication List  
  
   
This list is accurate as of 5/17/18 11:59 PM.  Always use your most recent med list.  
  
  
  
  
 albuterol 90 mcg/actuation inhaler Commonly known as:  PROAIR HFA Take 2 Puffs by inhalation every four (4) hours as needed for Wheezing or Shortness of Breath. amLODIPine 5 mg tablet Commonly known as:  [place] R[place] Take 1 Tab by mouth daily. levETIRAcetam 500 mg tablet Commonly known as:  KEPPRA Take 1 Tab by mouth two (2) times a day. metoprolol tartrate 50 mg tablet Commonly known as:  LOPRESSOR Take 1 Tab by mouth two (2) times a day. therapeutic multivitamin tablet Commonly known as:  [facility] Take 1 Tab by mouth daily. traZODone 100 mg tablet Commonly known as:  Connie Ori Take 1 Tab by mouth nightly. We Performed the Following CBC WITH AUTOMATED DIFF [34855 CPT(R)] OCCULT BLOOD, IMMUNOASSAY (FIT) [id number] CPT(R)] PROTEIN ELECTROPHORESIS [22226 CPT(R)] Follow-up Instructions Return in about 3 months (around 8/17/2018). To-Do List   
 05/17/2018 Imaging:  US ABD COMP [facility] & HEALTH SERVICES! Tobias Thrasher introduces [employer] patient portal. Now you can access parts of your medical record, email your doctor's office, and request medication refills online. 1. In your internet browser, go to [email or web address] 2. Click on the First Time User? Click Here link in the Sign In box. You will see the New Member Sign Up page. 3. Enter your [employer] Access Code exactly as it appears below. You will not need to use this code after youve completed the sign-up process.  If you do not sign up before the expiration date, you must request a new code. · A[employer] Access Code: [email or web address] Expires: 12/5/2018  9:26 AM 
 
4. Enter the last four digits of your Social Security Number (xxxx) and Date of Birth (mm/dd/yyyy) as indicated and click Submit. You will be taken to the next sign-up page. 5. Create a Azonia ID. This will be your A[employer] login ID and cannot be changed, so think of one that is secure and easy to remember. 6. Create a [employer] password. You can change your password at any time. 7. Enter your Password Reset Question and Answer. This can be used at a later time if you forget your password. 8. Enter your e-mail address. You will receive e-mail notification when new information is available in [address] 9. Click Sign Up. You can now view and download portions of your medical record. 10. Click the Download Summary menu link to download a portable copy of your medical information. If you have questions, please visit the Frequently Asked Questions section of the [employer] website. Remember, [employer] is NOT to be used for urgent needs. For medical emergencies, dial 911. Now available from your iPhone and Android! Please provide this summary of care documentation to your next provider. Your primary care clinician is listed as Marjan Wilder. If you have any questions after today's visit, please call [phone].

## 2018-05-17 NOTE — PROGRESS NOTES
SPORTS MEDICINE AND PRIMARY CARE  Christiano Velazquez MD, [address] [address]  Phone:  [phone]  Fax: [phone]       Chief Complaint   Patient presents with    Hypertension     f/u   . SUBJECTIVE:    Sweetie Freeman is a 57 y.o. male Patient returns today with known history of alcohol abuse, primary hypertension, depression, abnormal LFTs, and is seen for evaluation. We recommended a colonoscopy previously, he declined. He sent us an FIT. I see it was sent, but no results are recorded. Patient denies specific complaints today and is seen for evaluation. Current Outpatient Prescriptions   Medication Sig Dispense Refill    metoprolol tartrate (LOPRESSOR) 50 mg tablet Take 1 Tab by mouth two (2) times a day. 60 Tab 11    amLODIPine (NORVASC) 5 mg tablet Take 1 Tab by mouth daily. 30 Tab 11    levETIRAcetam (KEPPRA) 500 mg tablet Take 1 Tab by mouth two (2) times a day. 60 Tab 11    albuterol (PROAIR HFA) 90 mcg/actuation inhaler Take 2 Puffs by inhalation every four (4) hours as needed for Wheezing or Shortness of Breath. 1 Inhaler 11    traZODone (DESYREL) 100 mg tablet Take 1 Tab by mouth nightly. 30 Tab 11    therapeutic multivitamin (THERAGRAN) tablet Take 1 Tab by mouth daily.  30 Tab 3     Past Medical History:   Diagnosis Date    Anemia     Depression     Hypertension      Past Surgical History:   Procedure Laterality Date    HX COLONOSCOPY  at age 44     Allergies   Allergen Reactions    Other Medication Hives     Ketchup(cheap brands) causes hives         REVIEW OF SYSTEMS:  General: negative for - chills or fever  ENT: negative for - headaches, nasal congestion or tinnitus  Respiratory: negative for - cough, hemoptysis, shortness of breath or wheezing  Cardiovascular : negative for - chest pain, edema, palpitations or shortness of breath  Gastrointestinal: negative for - abdominal pain, blood in stools, heartburn or nausea/vomiting  Genito-Urinary: no dysuria, trouble voiding, or hematuria  Musculoskeletal: negative for - gait disturbance, joint pain, joint stiffness or joint swelling  Neurological: no TIA or stroke symptoms  Hematologic: no bruises, no bleeding, no swollen glands  Integument: no lumps, mole changes, nail changes or rash  Endocrine: no malaise/lethargy or unexpected weight changes      Social History     Social History    Marital status: SINGLE     Spouse name: N/A    Number of children: 1    Years of education: N/A     Occupational History          on disbiltiy arthritis and depression      Social History Main Topics    Smoking status: Never Smoker    Smokeless tobacco: Never Used    Alcohol use 1.2 oz/week     2 Cans of beer per week      Comment: 6-8 cans of Lite beer per week    Drug use: No      Comment: denies     Sexual activity: Yes     Partners: Female     Birth control/ protection: Condom, None      Comment: sister is CHRISTEN Shipley [phone]     Other Topics Concern    None     Social History Narrative     Family History   Problem Relation Age of Onset    Alcohol abuse Mother     Alcohol abuse Father     Hypertension Sister        OBJECTIVE:    Visit Vitals    /85    Pulse (!) 54    Temp 97.8 °F (36.6 °C) (Oral)    Resp 16    Ht 5' 10\" (1.778 m)    Wt 157 lb 12.8 oz (71.6 kg)    SpO2 98%    BMI 22.64 kg/m2     CONSTITUTIONAL: well , well nourished, appears age appropriate  EYES: perrla, eom intact  ENMT:moist mucous membranes, pharynx clear  NECK: supple. Thyroid normal  RESPIRATORY: Chest: clear bilaterally   CARDIOVASCULAR: Heart: regular rate and rhythm  GASTROINTESTINAL: Abdomen: soft, bowel sounds active  HEMATOLOGIC: no pathological lymph nodes palpated  MUSCULOSKELETAL: Extremities: no edema, pulse 1+   INTEGUMENT: No unusual rashes or suspicious skin lesions noted. Nails appear normal.  NEUROLOGIC: non-focal exam   MENTAL STATUS: alert and oriented, appropriate affect           ASSESSMENT:  1. Essential hypertension    2. ETOH abuse    3. Anemia, unspecified type      I'm very concerned about his LFTs and the abnormalities. We suggest he stop alcohol completely as we have in the past.  He claims he can stop. He has anemia, which I suspect is related to the suppression related to the alcohol use. Certainly the hemoccult needs to be completed and we'll also rule out other pathology causing the anemia with a protein electrophoresis. We will probably want to see blood count with a CBC today. BP control is at goal.  Repeat BP today is 138/82. We ask him to come back to see us in about three months, sooner if he has any problems. PLAN:  .  Orders Placed This Encounter    US ABD COMP    OCCULT BLOOD, IMMUNOASSAY (FIT)    CBC WITH AUTOMATED DIFF    PROTEIN ELECTROPHORESIS       Follow-up Disposition:  Return in about 3 months (around 8/17/2018). ATTENTION:   This medical record was transcribed using an electronic medical records system. Although proofread, it may and can contain electronic and spelling errors. Other human spelling and other errors may be present. Corrections may be executed at a later time. Please feel free to contact us for any clarifications as needed.

## 2018-05-18 LAB
ALBUMIN SERPL ELPH-MCNC: 3.5 G/DL (ref 2.9–4.4)
ALBUMIN/GLOB SERPL: 0.7 {RATIO} (ref 0.7–1.7)
ALPHA1 GLOB SERPL ELPH-MCNC: 0.4 G/DL (ref 0–0.4)
ALPHA2 GLOB SERPL ELPH-MCNC: 0.8 G/DL (ref 0.4–1)
B-GLOBULIN SERPL ELPH-MCNC: 1.3 G/DL (ref 0.7–1.3)
BASOPHILS # BLD AUTO: 0 X10E3/UL (ref 0–0.2)
BASOPHILS NFR BLD AUTO: 1 %
EOSINOPHIL # BLD AUTO: 0.1 X10E3/UL (ref 0–0.4)
EOSINOPHIL NFR BLD AUTO: 1 %
ERYTHROCYTE [DISTWIDTH] IN BLOOD BY AUTOMATED COUNT: 14.4 % (ref 12.3–15.4)
GAMMA GLOB SERPL ELPH-MCNC: 2.3 G/DL (ref 0.4–1.8)
GLOBULIN SER CALC-MCNC: 4.7 G/DL (ref 2.2–3.9)
HCT VFR BLD AUTO: 33.5 % (ref 37.5–51)
HGB BLD-MCNC: 11.3 G/DL (ref 13–17.7)
IMM GRANULOCYTES # BLD: 0 X10E3/UL (ref 0–0.1)
IMM GRANULOCYTES NFR BLD: 0 %
LYMPHOCYTES # BLD AUTO: 1.9 X10E3/UL (ref 0.7–3.1)
LYMPHOCYTES NFR BLD AUTO: 31 %
M PROTEIN SERPL ELPH-MCNC: ABNORMAL G/DL
MCH RBC QN AUTO: 30.1 PG (ref 26.6–33)
MCHC RBC AUTO-ENTMCNC: 33.7 G/DL (ref 31.5–35.7)
MCV RBC AUTO: 89 FL (ref 79–97)
MONOCYTES # BLD AUTO: 1.2 X10E3/UL (ref 0.1–0.9)
MONOCYTES NFR BLD AUTO: 20 %
MORPHOLOGY BLD-IMP: ABNORMAL
NEUTROPHILS # BLD AUTO: 2.9 X10E3/UL (ref 1.4–7)
NEUTROPHILS NFR BLD AUTO: 47 %
PLATELET # BLD AUTO: 147 X10E3/UL (ref 150–379)
PLEASE NOTE, 011150: ABNORMAL
PROT SERPL-MCNC: 8.2 G/DL (ref 6–8.5)
RBC # BLD AUTO: 3.75 X10E6/UL (ref 4.14–5.8)
WBC # BLD AUTO: 6.2 X10E3/UL (ref 3.4–10.8)

## 2018-10-05 RX ORDER — AMLODIPINE BESYLATE 5 MG/1
5 TABLET ORAL DAILY
Qty: 30 TAB | Refills: 11 | Status: SHIPPED | OUTPATIENT
Start: 2018-10-05 | End: 2019-08-30 | Stop reason: SDUPTHER

## 2018-10-05 RX ORDER — TRAZODONE HYDROCHLORIDE 100 MG/1
100 TABLET ORAL
Qty: 30 TAB | Refills: 11 | Status: SHIPPED | OUTPATIENT
Start: 2018-10-05 | End: 2019-08-30 | Stop reason: SDUPTHER

## 2018-10-05 RX ORDER — METOPROLOL TARTRATE 50 MG/1
50 TABLET ORAL 2 TIMES DAILY
Qty: 60 TAB | Refills: 11 | Status: SHIPPED | OUTPATIENT
Start: 2018-10-05 | End: 2019-08-30 | Stop reason: SDUPTHER

## 2018-11-01 ENCOUNTER — OFFICE VISIT (OUTPATIENT)
Dept: INTERNAL MEDICINE CLINIC | Age: 63
End: 2018-11-01

## 2018-11-01 VITALS
TEMPERATURE: 97.8 F | OXYGEN SATURATION: 98 % | BODY MASS INDEX: 22.64 KG/M2 | SYSTOLIC BLOOD PRESSURE: 111 MMHG | DIASTOLIC BLOOD PRESSURE: 72 MMHG | RESPIRATION RATE: 16 BRPM | HEIGHT: 70 IN | HEART RATE: 62 BPM

## 2018-11-01 DIAGNOSIS — Z13.31 SCREENING FOR DEPRESSION: ICD-10-CM

## 2018-11-01 DIAGNOSIS — R25.1 TREMORS OF NERVOUS SYSTEM: ICD-10-CM

## 2018-11-01 DIAGNOSIS — I10 ESSENTIAL HYPERTENSION: ICD-10-CM

## 2018-11-01 DIAGNOSIS — F10.10 ETOH ABUSE: ICD-10-CM

## 2018-11-01 DIAGNOSIS — R35.1 NOCTURIA: ICD-10-CM

## 2018-11-01 DIAGNOSIS — Z13.39 SCREENING FOR ALCOHOLISM: ICD-10-CM

## 2018-11-01 DIAGNOSIS — J45.20 MILD INTERMITTENT ASTHMA WITHOUT COMPLICATION: ICD-10-CM

## 2018-11-01 DIAGNOSIS — F03.90 DEMENTIA WITHOUT BEHAVIORAL DISTURBANCE, UNSPECIFIED DEMENTIA TYPE: ICD-10-CM

## 2018-11-01 DIAGNOSIS — F32.A MILD DEPRESSION: ICD-10-CM

## 2018-11-01 DIAGNOSIS — Z90.49 HX OF CHOLECYSTECTOMY: ICD-10-CM

## 2018-11-01 DIAGNOSIS — Z00.00 MEDICARE ANNUAL WELLNESS VISIT, SUBSEQUENT: Primary | ICD-10-CM

## 2018-11-01 DIAGNOSIS — R79.9 ABNORMAL FINDING OF BLOOD CHEMISTRY: ICD-10-CM

## 2018-11-01 DIAGNOSIS — D64.9 ANEMIA, UNSPECIFIED TYPE: ICD-10-CM

## 2018-11-01 RX ORDER — THIAMINE HCL 100 MG
TABLET ORAL
Refills: 0 | COMMUNITY
Start: 2018-10-11 | End: 2018-11-07 | Stop reason: SDUPTHER

## 2018-11-01 RX ORDER — FOLIC ACID 1 MG/1
TABLET ORAL
Refills: 0 | COMMUNITY
Start: 2018-10-11 | End: 2018-11-07 | Stop reason: SDUPTHER

## 2018-11-01 NOTE — PROGRESS NOTES
SPORTS MEDICINE AND PRIMARY CARE Andrez Thompson MD, [address] [address] Phone:  [phone]  Fax: [phone] Chief Complaint Patient presents with [facility] Annual Wellness Visit [facility] Follow Up SUBECTIVE: 
 
Quintin Aguilar is a 70 y.o. male Patient returns today with known history of alcoholism, depression, anemia, primary hypertension, asthma, and is seen for evaluation. Patient returns today with his sister and caregiver, Caitie Mi, at [phone]. Since we last saw him he was admitted on 09/09/18 to [facility] under the services of Gracia Liz MD, and during that admission underwent an open cholecystectomy by Dr. Leni Kruse, for whom he was supposed to see on 10/23/18, but appointment was missed because of communication issues. He currently is without complaints, but has made a decision to go against all doctors' medical advice and continue to drink alcohol. He does complain of diarrhea once or twice a day and is seen for evaluation. Sister also notes he has a single staple that needs to be removed. Current Outpatient Medications Medication Sig Dispense Refill  folic acid (FOLVITE) 1 mg tablet TAKE 1 TABLET BY MOUTH EVERY DAY  0  
 VITAMIN B-1 100 mg tablet TAKE 1 TABLET BY MOUTH EVERY DAY  0  
 traZODone (DESYREL) 100 mg tablet Take 1 Tab by mouth nightly. 30 Tab 11  
 metoprolol tartrate (LOPRESSOR) 50 mg tablet Take 1 Tab by mouth two (2) times a day. 60 Tab 11  
 amLODIPine (NORVASC) 5 mg tablet Take 1 Tab by mouth daily. 30 Tab 11  
 levETIRAcetam (KEPPRA) 500 mg tablet Take 1 Tab by mouth two (2) times a day. 60 Tab 11  
 albuterol (PROAIR HFA) 90 mcg/actuation inhaler Take 2 Puffs by inhalation every four (4) hours as needed for Wheezing or Shortness of Breath. 1 Inhaler 11  therapeutic multivitamin (THERAGRAN) tablet Take 1 Tab by mouth daily. 30 Tab 3 Past Medical History:  
Diagnosis Date  Anemia  Depression  Hx of cholecystectomy 09/12/2018  
 homar borrero md rt  Hypertension Past Surgical History:  
Procedure Laterality Date  HX COLONOSCOPY  at age 44 Allergies Allergen Reactions  Other Medication Hives Ketchup(cheap brands) causes hives REVIEW OF SYSTEMS: 
 No melena, no hematochezia. Social History Socioeconomic History  Marital status: SINGLE Spouse name: Not on file  Number of children: 1  Years of education: Not on file  Highest education level: Not on file Social Needs  Financial resource strain: Not on file  Food insecurity - worry: Not on file  Food insecurity - inability: Not on file  Transportation needs - medical: Not on file  Transportation needs - non-medical: Not on file Occupational History Comment: on [employer] arthritis and depression Tobacco Use  Smoking status: Never Smoker  Smokeless tobacco: Never Used Substance and Sexual Activity  Alcohol use: Yes Alcohol/week: 1.2 oz Types: 2 Cans of beer per week Comment: 6-8 cans of Lite beer per week  Drug use: No  
  Comment: denies  Sexual activity: Not Currently Partners: Female Birth control/protection: Condom, None Comment: sister is CHRISTEN Gregory [phone] Other Topics Concern  Not on file Social History Narrative Habits:  He states he only drinks one to two beers a day, however his sister cannot confirm that. She states he has gin plus a 20 ounce beer on a regular basis. The records reflect a long history of alcoholism. He denies cigarette abuse and drug abuse.  
    
 Social History:  The patient states with his brother with Arvind Marchdavid checking on both of them and making sure he takes his medications and gets his appointments. He completed the 12th grade. He is disabled due to depression since 2007. He has a 39year old daughter and three grandchildren. The patient is single.   He previously worked in the kitchen at [employer].  
    
 Family History:  Father  in his 45s of alcohol abuse. Mother  in her 62s of alcohol abuse. Eight siblings are alive and well. One sister  of alcohol and its complications  
   
   
r Family History Problem Relation Age of Onset  Alcohol abuse Mother  Alcohol abuse Father  Hypertension Sister OBJECTIVE: 
Visit Vitals /72 Pulse 62 Temp 97.8 °F (36.6 °C) (Oral) Resp 16 Ht 5' 10\" (1.778 m) SpO2 98% BMI 22.64 kg/m² ENT: perrla,  eom intact NECK: supple. Thyroid normal 
CHEST: clear to ascultation and percussion HEART: regular rate and rhythm ABD: soft, bowel sounds active EXTREMITIES: no edema, pulse 1+ No visits with results within 3 Month(s) from this visit. Latest known visit with results is:  
Office Visit on 2018 Component Date Value Ref Range Status  WBC 2018 6.2  3.4 - 10.8 x10E3/uL Final  
 RBC 2018 3.75* 4.14 - 5.80 x10E6/uL Final  
 HGB 2018 11.3* 13.0 - 17.7 g/dL Final  
 HCT 2018 33.5* 37.5 - 51.0 % Final  
 MCV 2018 89  79 - 97 fL Final  
 MCH 2018 30.1  26.6 - 33.0 pg Final  
 MCHC 2018 33.7  31.5 - 35.7 g/dL Final  
 RDW 2018 14.4  12.3 - 15.4 % Final  
 PLATELET  546* 150 - 379 x10E3/uL Final  
 NEUTROPHILS 2018 47  Not Estab. % Final  
 Lymphocytes 2018 31  Not Estab. % Final  
 MONOCYTES 2018 20  Not Estab. % Final  
 EOSINOPHILS 2018 1  Not Estab. % Final  
 BASOPHILS 2018 1  Not Estab. % Final  
 ABS. NEUTROPHILS 2018 2.9  1.4 - 7.0 x10E3/uL Final  
 Abs Lymphocytes 2018 1.9  0.7 - 3.1 x10E3/uL Final  
 ABS. MONOCYTES 2018 1.2* 0.1 - 0.9 x10E3/uL Final  
 ABS. EOSINOPHILS 2018 0.1  0.0 - 0.4 x10E3/uL Final  
 ABS.  BASOPHILS 2018 0.0  0.0 - 0.2 x10E3/uL Final  
 IMMATURE GRANULOCYTES 2018 0  Not Estab. % Final  
  ABS. IMM. GRANS. 05/17/2018 0.0  0.0 - 0.1 x10E3/uL Final  
 Hematology comments: 05/17/2018 Note:   Final  
 Verified by microscopic examination.  Protein, total 05/17/2018 8.2  6.0 - 8.5 g/dL Final  
 Albumin 05/17/2018 3.5  2.9 - 4.4 g/dL Final  
 Alpha-1-globulin 05/17/2018 0.4  0.0 - 0.4 g/dL Final  
 ALPHA-2 GLOBULIN 05/17/2018 0.8  0.4 - 1.0 g/dL Final  
 Beta globulin 05/17/2018 1.3  0.7 - 1.3 g/dL Final  
 Gamma globulin 05/17/2018 2.3* 0.4 - 1.8 g/dL Final  
 M-Jose Miguel 05/17/2018 Not Observed  Not Observed g/dL Final  
 Globulin, total 05/17/2018 4.7* 2.2 - 3.9 g/dL Final  
 A/G ratio 05/17/2018 0.7  0.7 - 1.7 Final  
 Please note 05/17/2018 Comment   Final  
 Comment: Protein electrophoresis scan will follow via computer, mail, or 
 delivery. ASSESSMENT: 
1. Medicare annual wellness visit, subsequent 2. Screening for alcoholism 3. Screening for depression 4. Essential hypertension 5. Mild depression ([address] 6. ETOH abuse 7. Mild intermittent asthma without complication 8. Tremors of nervous system 9. Dementia without behavioral disturbance, unspecified dementia type 10. Anemia, unspecified type 11. Hx of cholecystectomy 12. Abnormal finding of blood chemistry 13. Nocturia Patient's medical status is stable. We recommend a colonoscopy, particularly in view of the fact that he had diarrhea. Both he and sister decline. We suggest probiotics and Pepto Bismol for diarrhea in the interim. His sister will make the appointment for him to see Dr. Ilir Graham for follow up of his open cholecystectomy. We encourage him to discontinue alcohol. We will check his renal status and he will be back to see us in four to six months, particularly to look at his renal functions to be sure that they return completely to normal.  His blood pressure control is at goal.  BMI is within ideal body weight. I have discussed the diagnosis with the patient and the intended plan as seen in the 
orders above. The patient understands and agees with the plan. The patient has  
received an after visit summary and questions were answered concerning 
future plans Patient labs and/or xrays were reviewed Past records were reviewed. PLAN: 
. Orders Placed This Encounter  Depression Screen Annual  
 URINALYSIS W/ RFLX MICROSCOPIC  CBC WITH AUTOMATED DIFF  
 METABOLIC PANEL, COMPREHENSIVE  LIPID PANEL  
 PROSTATE SPECIFIC AG  
 HEMOGLOBIN A1C WITH EAG  
 AMMONIA  folic acid (FOLVITE) 1 mg tablet  VITAMIN B-1 100 mg tablet Follow-up Disposition: 
Return in about 4 months (around 3/1/2019). ATTENTION:  
This medical record was transcribed using an electronic medical records system. Although proofread, it may and can contain electronic and spelling errors. Other human spelling and other errors may be present. Corrections may be executed at a later time. Please feel free to contact us for any clarifications as needed.

## 2018-11-01 NOTE — PATIENT INSTRUCTIONS
Medicare Wellness Visit, Male The best way to live healthy is to have a lifestyle where you eat a well-balanced diet, exercise regularly, limit alcohol use, and quit all forms of tobacco/nicotine, if applicable. Regular preventive services are another way to keep healthy. Preventive services (vaccines, screening tests, monitoring & exams) can help personalize your care plan, which helps you manage your own care. Screening tests can find health problems at the earliest stages, when they are easiest to treat. [facility] follows the current, evidence-based guidelines published by the [facility] Adam[facility] ([facility]) when recommending preventive services for our patients. Because we follow these guidelines, sometimes recommendations change over time as research supports it. (For example, a prostate screening blood test is no longer routinely recommended for men with no symptoms.) Of course, you and your doctor may decide to screen more often for some diseases, based on your risk and co-morbidities (chronic disease you are already diagnosed with). Preventive services for you include: - Medicare offers their members a free annual wellness visit, which is time for you and your primary care provider to discuss and plan for your preventive service needs. Take advantage of this benefit every year! 
-All adults over age 72 should receive the recommended pneumonia vaccines. Current USPSTF guidelines recommend a series of two vaccines for the best pneumonia protection.  
-All adults should have a flu vaccine yearly and an ECG.  All adults age 61 and older should receive a shingles vaccine once in their lifetime.   
-All adults age 38-68 who are overweight should have a diabetes screening test once every three years.  
-Other screening tests & preventive services for persons with diabetes include: an eye exam to screen for diabetic retinopathy, a kidney function test, a foot exam, and stricter control over your cholesterol.  
-Cardiovascular screening for adults with routine risk involves an electrocardiogram (ECG) at intervals determined by the provider.  
-Colorectal cancer screening should be done for adults age 54-65 with no increased risk factors for colorectal cancer. There are a number of acceptable methods of screening for this type of cancer. Each test has its own benefits and drawbacks. Discuss with your provider what is most appropriate for you during your annual wellness visit. The different tests include: colonoscopy (considered the best screening method), a fecal occult blood test, a fecal DNA test, and sigmoidoscopy. 
-All adults born between [facility] should be screened once for Hepatitis C. 
-An Abdominal Aortic Aneurysm (AAA) Screening is recommended for men age 73-68 who has ever smoked in their lifetime. Here is a list of your current Health Maintenance items (your personalized list of preventive services) with a due date: 
Health Maintenance Due Topic Date Due  
 Colonoscopy  06/28/1971  Glaucoma Screening   06/28/2018 [address] [place] [address] Annual Well Visit  10/13/2018

## 2018-11-01 NOTE — PROGRESS NOTES
1On any occasion in the past three months you have had more than 7 drinks containing alcohol You average more than 14 drinks a week. . Have you been to the ER, urgent care clinic since your last visit? Hospitalized since your last visit? Yes When: 9-4-18 Reason for visit: kidney issues 2. Have you seen or consulted any other health care providers outside of the [address] [place] [address] since your last visit? Include any pap smears or colon screening. Yes Where: [facility] Complaints of left knee pain This is the Subsequent Medicare Annual Wellness Exam, performed 12 months or more after the Initial AWV or the last Subsequent AWV I have reviewed the patient's medical history in detail and updated the computerized patient record. History Past Medical History:  
Diagnosis Date  Anemia  Depression  Hypertension Past Surgical History:  
Procedure Laterality Date  HX COLONOSCOPY  at age 44 Current Outpatient Medications Medication Sig Dispense Refill  folic acid (FOLVITE) 1 mg tablet TAKE 1 TABLET BY MOUTH EVERY DAY  0  
 VITAMIN B-1 100 mg tablet TAKE 1 TABLET BY MOUTH EVERY DAY  0  
 traZODone (DESYREL) 100 mg tablet Take 1 Tab by mouth nightly. 30 Tab 11  
 metoprolol tartrate (LOPRESSOR) 50 mg tablet Take 1 Tab by mouth two (2) times a day. 60 Tab 11  
 amLODIPine (NORVASC) 5 mg tablet Take 1 Tab by mouth daily. 30 Tab 11  
 levETIRAcetam (KEPPRA) 500 mg tablet Take 1 Tab by mouth two (2) times a day. 60 Tab 11  
 albuterol (PROAIR HFA) 90 mcg/actuation inhaler Take 2 Puffs by inhalation every four (4) hours as needed for Wheezing or Shortness of Breath. 1 Inhaler 11  therapeutic multivitamin (THERAGRAN) tablet Take 1 Tab by mouth daily. 30 Tab 3 Allergies Allergen Reactions  Other Medication Hives Ketchup(cheap brands) causes hives Family History Problem Relation Age of Onset  Alcohol abuse Mother  Alcohol abuse Father  Hypertension Sister Social History Tobacco Use  Smoking status: Never Smoker  Smokeless tobacco: Never Used Substance Use Topics  Alcohol use: Yes Alcohol/week: 1.2 oz Types: 2 Cans of beer per week Comment: 6-8 cans of Lite beer per week Patient Active Problem List  
Diagnosis Code  Tremors of nervous system R25.1  ETOH abuse F10.10  Asthma J45.909  Benign essential HTN I10  
 Convulsions ([address] R56.9  Anemia, unspecified D64.9  
 Unspecified dementia without behavioral disturbance F03.90  Mild intermittent asthma without complication I08.16  Advance directive discussed with patient Z70.80  
 Advanced care planning/counseling discussion Z71.89  
 Asthma, mild intermittent, well-controlled J45.20  Hypertension I10  
 Depression F32.9  Anemia D64.9 Depression Risk Factor Screening: PHQ over the last two weeks 10/12/2017 Little interest or pleasure in doing things Not at all Feeling down, depressed, irritable, or hopeless Not at all Total Score PHQ 2 0 Alcohol Risk Factor Screening: On any occasion in the past three months you have had more than 7 drinks containing alcohol You average more than 14 drinks a week. Functional Ability and Level of Safety:  
Hearing Loss Hearing is good. Activities of Daily Living The home contains: handrails and grab bars Patient needs help with:  phone, transportation, shopping, preparing meals, laundry, housework, managing medications, managing money, eating, dressing, bathing and hygiene Fall Risk Fall Risk Assessment, last 12 mths 11/1/2018 Able to walk? No  
Fall in past 12 months? No  
 
 
Abuse Screen Patient is not abused Cognitive Screening Evaluation of Cognitive Function: 
Has your family/caregiver stated any concerns about your memory: yes Normal, Abnormal 
 
Patient Care Team  
Patient Care Team: 
Jeffery Thornton MD as PCP - General (Internal Medicine) Thompson Ordonez LPN as Nurse Navigator Taras Miner RN as Nurse Navigator Assessment/Plan Education and counseling provided: 
Are appropriate based on today's review and evaluation Health Maintenance Due Topic Date Due  
 COLONOSCOPY  06/28[birth year]1971  Shingrix Vaccine Age 50> (1 of 2) 06/28/2003  GLAUCOMA SCREENING Q2Y  06/28/2018  Pneumococcal 65+ Low/Medium Risk (1 of 2 - PCV13) 06/28/2018  Influenza Age 5 to Adult  08/01/2018  MEDICARE YEARLY EXAM  10/13/2018

## 2018-11-01 NOTE — LETTER
11/3/2018 2:34 PM 
 
Mr. Katherine Waldrop [facility] Dear Caroline Miles, Some of your laboratory studies are abnormal.  Please return to office for the following labs to be repeated: 
Cbc 
tibc Ferritin B12/folic acid 
retic 
fit Please find your most recent results below. Resulted Orders CBC WITH AUTOMATED DIFF Result Value Ref Range WBC 15.4 (H) 3.4 - 10.8 x10E3/uL  
 RBC 3.01 (L) 4.14 - 5.80 x10E6/uL HGB 8.7 (L) 13.0 - 17.7 g/dL HCT 26.4 (L) 37.5 - 51.0 % MCV 88 79 - 97 fL  
 MCH 28.9 26.6 - 33.0 pg  
 MCHC 33.0 31.5 - 35.7 g/dL  
 RDW 15.8 (H) 12.3 - 15.4 % PLATELET 090 962 - 555 x10E3/uL NEUTROPHILS 70 Not Estab. % Lymphocytes 18 Not Estab. % MONOCYTES 10 Not Estab. % EOSINOPHILS 2 Not Estab. % BASOPHILS 0 Not Estab. %  
 ABS. NEUTROPHILS 10.7 (H) 1.4 - 7.0 x10E3/uL Abs Lymphocytes 2.8 0.7 - 3.1 x10E3/uL  
 ABS. MONOCYTES 1.6 (H) 0.1 - 0.9 x10E3/uL  
 ABS. EOSINOPHILS 0.4 0.0 - 0.4 x10E3/uL  
 ABS. BASOPHILS 0.0 0.0 - 0.2 x10E3/uL IMMATURE GRANULOCYTES 0 Not Estab. %  
 ABS. IMM. GRANS. 0.0 0.0 - 0.1 x10E3/uL Hematology comments: Note:   
   Comment:  
   Verified by microscopic examination. Narrative Performed at:  [address]  [id number] : Joelle Cavanaugh MD, Phone:  [phone] METABOLIC PANEL, COMPREHENSIVE Result Value Ref Range Glucose 85 65 - 99 mg/dL Comment:  
   Specimen received hemolyzed. Clinical correlation indicated. BUN 18 8 - 27 mg/dL Creatinine 0.98 0.76 - 1.27 mg/dL GFR est non-AA 81 >59 mL/min/1.73 GFR est AA 93 >59 mL/min/1.73  
 BUN/Creatinine ratio 18 10 - 24 Sodium 135 134 - 144 mmol/L Potassium 4.9 3.5 - 5.2 mmol/L Comment:  
   Specimen received hemolyzed. Clinical correlation indicated. Chloride 101 96 - 106 mmol/L  
 CO2 17 (L) 20 - 29 mmol/L Calcium 8.7 8.6 - 10.2 mg/dL Protein, total 7.7 6.0 - 8.5 g/dL Albumin 2.7 (L) 3.6 - 4.8 g/dL GLOBULIN, TOTAL 5.0 (H) 1.5 - 4.5 g/dL A-G Ratio 0.5 (L) 1.2 - 2.2 Bilirubin, total 0.3 0.0 - 1.2 mg/dL Alk. phosphatase 143 (H) 39 - 117 IU/L  
 AST (SGOT) 40 0 - 40 IU/L  
 ALT (SGPT) 8 0 - 44 IU/L Narrative Performed at:  [address]  [id number] : Davin Edmondson MD, Phone:  [phone] LIPID PANEL Result Value Ref Range Cholesterol, total 148 100 - 199 mg/dL Triglyceride 113 0 - 149 mg/dL HDL Cholesterol 46 >39 mg/dL VLDL, calculated 23 5 - 40 mg/dL LDL, calculated 79 0 - 99 mg/dL Narrative Performed at:  [address]  [id number] : Davin Edmondson MD, Phone:  [phone] PSA, DIAGNOSTIC (PROSTATE SPECIFIC AG) Result Value Ref Range Prostate Specific Ag 0.3 0.0 - 4.0 ng/mL Comment:  
   Roche ECLIA methodology. According to the American Urological Association, Serum PSA should 
decrease and remain at undetectable levels after radical 
prostatectomy. The AUA defines biochemical recurrence as an initial 
PSA value 0.2 ng/mL or greater followed by a subsequent confirmatory PSA value 0.2 ng/mL or greater. Values obtained with different assay methods or kits cannot be used 
interchangeably. Results cannot be interpreted as absolute evidence 
of the presence or absence of malignant disease. Narrative Performed at:  [address]  [id number] : Davin Edmondson MD, Phone:  [phone] HEMOGLOBIN A1C WITH EAG Result Value Ref Range Hemoglobin A1c 5.3 4.8 - 5.6 % Comment:  
            Prediabetes: 5.7 - 6.4 Diabetes: >6.4 Glycemic control for adults with diabetes: <7.0 Estimated average glucose 105 mg/dL Narrative Performed at:  [address]  [id number] : Annette Hart MD, Phone:  [phone] Sincerely, Neha Malave MD, FACP, CMD

## 2018-11-03 LAB
ALBUMIN SERPL-MCNC: 2.7 G/DL (ref 3.6–4.8)
ALBUMIN/GLOB SERPL: 0.5 {RATIO} (ref 1.2–2.2)
ALP SERPL-CCNC: 143 IU/L (ref 39–117)
ALT SERPL-CCNC: 8 IU/L (ref 0–44)
AST SERPL-CCNC: 40 IU/L (ref 0–40)
BASOPHILS # BLD AUTO: 0 X10E3/UL (ref 0–0.2)
BASOPHILS NFR BLD AUTO: 0 %
BILIRUB SERPL-MCNC: 0.3 MG/DL (ref 0–1.2)
BUN SERPL-MCNC: 18 MG/DL (ref 8–27)
BUN/CREAT SERPL: 18 (ref 10–24)
CALCIUM SERPL-MCNC: 8.7 MG/DL (ref 8.6–10.2)
CHLORIDE SERPL-SCNC: 101 MMOL/L (ref 96–106)
CHOLEST SERPL-MCNC: 148 MG/DL (ref 100–199)
CO2 SERPL-SCNC: 17 MMOL/L (ref 20–29)
CREAT SERPL-MCNC: 0.98 MG/DL (ref 0.76–1.27)
EOSINOPHIL # BLD AUTO: 0.4 X10E3/UL (ref 0–0.4)
EOSINOPHIL NFR BLD AUTO: 2 %
ERYTHROCYTE [DISTWIDTH] IN BLOOD BY AUTOMATED COUNT: 15.8 % (ref 12.3–15.4)
EST. AVERAGE GLUCOSE BLD GHB EST-MCNC: 105 MG/DL
GLOBULIN SER CALC-MCNC: 5 G/DL (ref 1.5–4.5)
GLUCOSE SERPL-MCNC: 85 MG/DL (ref 65–99)
HBA1C MFR BLD: 5.3 % (ref 4.8–5.6)
HCT VFR BLD AUTO: 26.4 % (ref 37.5–51)
HDLC SERPL-MCNC: 46 MG/DL
HGB BLD-MCNC: 8.7 G/DL (ref 13–17.7)
IMM GRANULOCYTES # BLD: 0 X10E3/UL (ref 0–0.1)
IMM GRANULOCYTES NFR BLD: 0 %
LDLC SERPL CALC-MCNC: 79 MG/DL (ref 0–99)
LYMPHOCYTES # BLD AUTO: 2.8 X10E3/UL (ref 0.7–3.1)
LYMPHOCYTES NFR BLD AUTO: 18 %
MCH RBC QN AUTO: 28.9 PG (ref 26.6–33)
MCHC RBC AUTO-ENTMCNC: 33 G/DL (ref 31.5–35.7)
MCV RBC AUTO: 88 FL (ref 79–97)
MONOCYTES # BLD AUTO: 1.6 X10E3/UL (ref 0.1–0.9)
MONOCYTES NFR BLD AUTO: 10 %
MORPHOLOGY BLD-IMP: ABNORMAL
NEUTROPHILS # BLD AUTO: 10.7 X10E3/UL (ref 1.4–7)
NEUTROPHILS NFR BLD AUTO: 70 %
PLATELET # BLD AUTO: 347 X10E3/UL (ref 150–379)
POTASSIUM SERPL-SCNC: 4.9 MMOL/L (ref 3.5–5.2)
PROT SERPL-MCNC: 7.7 G/DL (ref 6–8.5)
PSA SERPL-MCNC: 0.3 NG/ML (ref 0–4)
RBC # BLD AUTO: 3.01 X10E6/UL (ref 4.14–5.8)
SODIUM SERPL-SCNC: 135 MMOL/L (ref 134–144)
TRIGL SERPL-MCNC: 113 MG/DL (ref 0–149)
VLDLC SERPL CALC-MCNC: 23 MG/DL (ref 5–40)
WBC # BLD AUTO: 15.4 X10E3/UL (ref 3.4–10.8)

## 2018-11-05 ENCOUNTER — TELEPHONE (OUTPATIENT)
Dept: INTERNAL MEDICINE CLINIC | Age: 63
End: 2018-11-05

## 2018-11-05 NOTE — TELEPHONE ENCOUNTER
Per Dr. Harmeet Van patient to return to office for repeat cbc, tibc, ferritin, b-12-folate, retic, and fit kit kit.

## 2018-11-07 RX ORDER — THIAMINE HCL 100 MG
TABLET ORAL
Qty: 30 TAB | Refills: 11 | Status: SHIPPED | OUTPATIENT
Start: 2018-11-07 | End: 2019-08-30 | Stop reason: SDUPTHER

## 2018-11-07 RX ORDER — FOLIC ACID 1 MG/1
TABLET ORAL
Qty: 30 TAB | Refills: 11 | Status: SHIPPED | OUTPATIENT
Start: 2018-11-07 | End: 2019-08-30 | Stop reason: SDUPTHER

## 2018-11-13 RX ORDER — MULTIPLE VITAMINS W/ MINERALS TAB 9MG-400MCG
TAB ORAL
Qty: 30 TAB | Refills: 11 | Status: SHIPPED | OUTPATIENT
Start: 2018-11-13 | End: 2019-08-30 | Stop reason: SDUPTHER

## 2018-11-26 RX ORDER — ALBUTEROL SULFATE 90 UG/1
2 AEROSOL, METERED RESPIRATORY (INHALATION)
Qty: 1 INHALER | Refills: 11 | Status: SHIPPED | OUTPATIENT
Start: 2018-11-26 | End: 2019-12-03 | Stop reason: SDUPTHER

## 2018-11-26 RX ORDER — LEVETIRACETAM 500 MG/1
500 TABLET ORAL 2 TIMES DAILY
Qty: 60 TAB | Refills: 11 | Status: SHIPPED | OUTPATIENT
Start: 2018-11-26 | End: 2019-09-23 | Stop reason: SDUPTHER

## 2018-12-18 ENCOUNTER — LAB ONLY (OUTPATIENT)
Dept: INTERNAL MEDICINE CLINIC | Age: 63
End: 2018-12-18

## 2018-12-18 DIAGNOSIS — D64.9 ANEMIA, UNSPECIFIED TYPE: Primary | ICD-10-CM

## 2018-12-19 LAB
BASOPHILS # BLD AUTO: 0 X10E3/UL (ref 0–0.2)
BASOPHILS NFR BLD AUTO: 1 %
EOSINOPHIL # BLD AUTO: 0.2 X10E3/UL (ref 0–0.4)
EOSINOPHIL NFR BLD AUTO: 3 %
ERYTHROCYTE [DISTWIDTH] IN BLOOD BY AUTOMATED COUNT: 17.9 % (ref 12.3–15.4)
FERRITIN SERPL-MCNC: 611 NG/ML (ref 30–400)
FOLATE SERPL-MCNC: 19.5 NG/ML
HCT VFR BLD AUTO: 33.9 % (ref 37.5–51)
HGB BLD-MCNC: 10.7 G/DL (ref 13–17.7)
IMM GRANULOCYTES # BLD: 0 X10E3/UL (ref 0–0.1)
IMM GRANULOCYTES NFR BLD: 0 %
IRON SATN MFR SERPL: 30 % (ref 15–55)
IRON SERPL-MCNC: 69 UG/DL (ref 38–169)
LYMPHOCYTES # BLD AUTO: 3.2 X10E3/UL (ref 0.7–3.1)
LYMPHOCYTES NFR BLD AUTO: 57 %
MCH RBC QN AUTO: 29.2 PG (ref 26.6–33)
MCHC RBC AUTO-ENTMCNC: 31.6 G/DL (ref 31.5–35.7)
MCV RBC AUTO: 92 FL (ref 79–97)
MONOCYTES # BLD AUTO: 0.6 X10E3/UL (ref 0.1–0.9)
MONOCYTES NFR BLD AUTO: 11 %
NEUTROPHILS # BLD AUTO: 1.5 X10E3/UL (ref 1.4–7)
NEUTROPHILS NFR BLD AUTO: 28 %
PLATELET # BLD AUTO: 352 X10E3/UL (ref 150–379)
RBC # BLD AUTO: 3.67 X10E6/UL (ref 4.14–5.8)
RETICS/RBC NFR AUTO: 2.9 % (ref 0.6–2.6)
TIBC SERPL-MCNC: 231 UG/DL (ref 250–450)
UIBC SERPL-MCNC: 162 UG/DL (ref 111–343)
VIT B12 SERPL-MCNC: 525 PG/ML (ref 232–1245)
WBC # BLD AUTO: 5.5 X10E3/UL (ref 3.4–10.8)

## 2019-03-07 ENCOUNTER — OFFICE VISIT (OUTPATIENT)
Dept: INTERNAL MEDICINE CLINIC | Age: 64
End: 2019-03-07

## 2019-03-07 DIAGNOSIS — D64.9 ANEMIA, UNSPECIFIED TYPE: ICD-10-CM

## 2019-03-07 DIAGNOSIS — J45.20 ASTHMA, MILD INTERMITTENT, WELL-CONTROLLED: ICD-10-CM

## 2019-03-07 DIAGNOSIS — R25.1 TREMORS OF NERVOUS SYSTEM: ICD-10-CM

## 2019-03-07 DIAGNOSIS — F10.10 ETOH ABUSE: ICD-10-CM

## 2019-03-07 DIAGNOSIS — I10 ESSENTIAL HYPERTENSION: Primary | ICD-10-CM

## 2019-03-07 NOTE — PROGRESS NOTES
SPORTS MEDICINE AND PRIMARY CARE  Griselda Neu, MD, [address] [address]  Phone:  [phone]  Fax: [phone]       Chief Complaint   Patient presents with    Hypertension     f/u   . SUBJECTIVE:    Bibi Shore is a 70 y.o. male Patient returns today with known history of primary hypertension, COPD, asthma, alcohol abuse, depression, and is seen for evaluation. Patient returns today with his sister, Ashley Kathleen, who has him out of the wheelchair, has been successful in getting him in an adult  program and generally doing well. The heavy drinking is less. He drinks maybe a beer or two on the weekends his sister tells me. He cannot drink during the week because of the . He has some arthritis in his knees, but exercises regularly and denies specific complaints. Current Outpatient Medications   Medication Sig Dispense Refill    albuterol (PROAIR HFA) 90 mcg/actuation inhaler Take 2 Puffs by inhalation every four (4) hours as needed for Wheezing or Shortness of Breath. 1 Inhaler 11    levETIRAcetam (KEPPRA) 500 mg tablet Take 1 Tab by mouth two (2) times a day. 60 Tab 11    THERAPY M 9 mg iron-400 mcg tab tablet TAKE 1 TABLET BY MOUTH EVERY DAY 30 Tab 11    VITAMIN B-1 100 mg tablet TAKE 1 TABLET BY MOUTH EVERY DAY 30 Tab 11    folic acid (FOLVITE) 1 mg tablet TAKE 1 TABLET BY MOUTH EVERY DAY 30 Tab 11    traZODone (DESYREL) 100 mg tablet Take 1 Tab by mouth nightly. 30 Tab 11    metoprolol tartrate (LOPRESSOR) 50 mg tablet Take 1 Tab by mouth two (2) times a day. 60 Tab 11    amLODIPine (NORVASC) 5 mg tablet Take 1 Tab by mouth daily. 30 Tab 11    therapeutic multivitamin (THERAGRAN) tablet Take 1 Tab by mouth daily.  27 Tab 3     Past Medical History:   Diagnosis Date    Anemia     Depression     Hx of cholecystectomy 09/12/2018    homar borrero md rt    Hypertension      Past Surgical History:   Procedure Laterality Date    HX COLONOSCOPY  at age 44     Allergies   Allergen Reactions    Other Medication Hives     Ketchup(cheap brands) causes hives         REVIEW OF SYSTEMS:  General: negative for - chills or fever  ENT: negative for - headaches, nasal congestion or tinnitus  Respiratory: negative for - cough, hemoptysis, shortness of breath or wheezing  Cardiovascular : negative for - chest pain, edema, palpitations or shortness of breath  Gastrointestinal: negative for - abdominal pain, blood in stools, heartburn or nausea/vomiting  Genito-Urinary: no dysuria, trouble voiding, or hematuria  Musculoskeletal: negative for - gait disturbance, joint pain, joint stiffness or joint swelling  Neurological: no TIA or stroke symptoms  Hematologic: no bruises, no bleeding, no swollen glands  Integument: no lumps, mole changes, nail changes or rash  Endocrine: no malaise/lethargy or unexpected weight changes      Social History     Socioeconomic History    Marital status: SINGLE     Spouse name: Not on file    Number of children: 1    Years of education: Not on file    Highest education level: Not on file   Occupational History     Comment: on disbiltiy arthritis and depression    Tobacco Use    Smoking status: Never Smoker    Smokeless tobacco: Never Used   Substance and Sexual Activity    Alcohol use: Yes     Alcohol/week: 1.2 oz     Types: 2 Cans of beer per week     Comment: daily    Drug use: No     Comment: denies     Sexual activity: Not Currently     Partners: Female     Birth control/protection: Condom, None     Comment: sister is CHRISTEN Thomson [facility] [phone]   Social History Narrative    Habits:  He states he only drinks one to two beers a day, however his sister cannot confirm that. She states he has gin plus a 20 ounce beer on a regular basis. The records reflect a long history of alcoholism.   He denies cigarette abuse and drug abuse.         Social History:  The patient states with his brother with Adriana Parker checking on both of them and making sure he takes his medications and gets his appointments. He completed the 12th grade. He is disabled due to depression since . He has a 39year old daughter and three grandchildren. The patient is single. He previously worked in the kitchen at [employer]         Family History:  Father  in his 45s of alcohol abuse. Mother  in her 62s of alcohol abuse. Eight siblings are alive and well. One sister  of alcohol and its complications             Family History   Problem Relation Age of Onset    Alcohol abuse Mother     Alcohol abuse Father     Hypertension Sister        OBJECTIVE:    Visit Vitals  /87   Pulse 81   Temp 98.6 °F (37 °C) (Oral)   Resp 20   Ht 5' 10\" (1.778 m)   Wt 141 lb 3.2 oz (64 kg)   SpO2 98%   BMI 20.26 kg/m²     CONSTITUTIONAL: well , well nourished, appears age appropriate  EYES: perrla, eom intact  ENMT:moist mucous membranes, pharynx clear  NECK: supple. Thyroid normal  RESPIRATORY: Chest: clear bilaterally   CARDIOVASCULAR: Heart: regular rate and rhythm  GASTROINTESTINAL: Abdomen: soft, bowel sounds active  HEMATOLOGIC: no pathological lymph nodes palpated  MUSCULOSKELETAL: Extremities: no edema, pulse 1+   INTEGUMENT: No unusual rashes or suspicious skin lesions noted. Nails appear normal.  NEUROLOGIC: non-focal exam   MENTAL STATUS: alert and oriented, appropriate affect           ASSESSMENT:  1. Essential hypertension    2. Asthma, mild intermittent, well-controlled    3. Anemia, unspecified type    4. ETOH abuse    5. Tremors of nervous system      Patient's medical status is stable. He is down to one or two alcoholic drinks a week, which is certainly a significant improvement compared to his baseline. We congratulate him. His blood pressure control is excellent, repeat blood pressure is 136/80. His BMI represents ideal body weight.     We encouraged him to continue physical activity, exercises and walking on a regular basis. It is good to see him out of the wheelchair. He will be back to see us in four to six months, sooner if he has any problems. His sister is advised she can have him come in at any time should he have an issue and unable to get an appointment. I have discussed the diagnosis with the patient and the intended plan as seen in the  orders above. The patient understands and agees with the plan. The patient has   received an after visit summary and questions were answered concerning  future plans  Patient labs and/or xrays were reviewed  Past records were reviewed. PLAN:  . No orders of the defined types were placed in this encounter. Follow-up Disposition:  Return in about 4 months (around 7/7/2019). ATTENTION:   This medical record was transcribed using an electronic medical records system. Although proofread, it may and can contain electronic and spelling errors. Other human spelling and other errors may be present. Corrections may be executed at a later time. Please feel free to contact us for any clarifications as needed.

## 2019-03-07 NOTE — PROGRESS NOTES
1. Have you been to the ER, urgent care clinic since your last visit? Hospitalized since your last visit? No    2. Have you seen or consulted any other health care providers outside of the [address] [place] [address] since your last visit? Include any pap smears or colon screening.  No     Wants to discuss indigestion

## 2019-03-08 VITALS
HEIGHT: 70 IN | HEART RATE: 81 BPM | OXYGEN SATURATION: 98 % | RESPIRATION RATE: 20 BRPM | BODY MASS INDEX: 20.22 KG/M2 | SYSTOLIC BLOOD PRESSURE: 136 MMHG | DIASTOLIC BLOOD PRESSURE: 80 MMHG | TEMPERATURE: 98.6 F | WEIGHT: 141.2 LBS

## 2019-08-31 RX ORDER — MULTIPLE VITAMINS W/ MINERALS TAB 9MG-400MCG
TAB ORAL
Qty: 336 TAB | Refills: 3 | Status: SHIPPED | OUTPATIENT
Start: 2019-08-31

## 2019-08-31 RX ORDER — FOLIC ACID 1 MG/1
TABLET ORAL
Qty: 30 TAB | Refills: 11 | Status: SHIPPED | OUTPATIENT
Start: 2019-08-31 | End: 2020-03-25

## 2019-08-31 RX ORDER — LANOLIN ALCOHOL/MO/W.PET/CERES
CREAM (GRAM) TOPICAL
Qty: 30 TAB | Refills: 11 | Status: SHIPPED | OUTPATIENT
Start: 2019-08-31 | End: 2020-03-25

## 2019-08-31 RX ORDER — AMLODIPINE BESYLATE 5 MG/1
TABLET ORAL
Qty: 30 TAB | Refills: 11 | Status: SHIPPED | OUTPATIENT
Start: 2019-08-31 | End: 2020-03-25

## 2019-08-31 RX ORDER — METOPROLOL TARTRATE 50 MG/1
TABLET ORAL
Qty: 60 TAB | Refills: 11 | Status: SHIPPED | OUTPATIENT
Start: 2019-08-31 | End: 2020-03-25

## 2019-08-31 RX ORDER — TRAZODONE HYDROCHLORIDE 100 MG/1
TABLET ORAL
Qty: 30 TAB | Refills: 11 | Status: SHIPPED | OUTPATIENT
Start: 2019-08-31 | End: 2020-03-25

## 2019-09-12 ENCOUNTER — OFFICE VISIT (OUTPATIENT)
Dept: INTERNAL MEDICINE CLINIC | Age: 64
End: 2019-09-12

## 2019-09-12 VITALS
SYSTOLIC BLOOD PRESSURE: 140 MMHG | DIASTOLIC BLOOD PRESSURE: 80 MMHG | HEART RATE: 60 BPM | TEMPERATURE: 97.8 F | BODY MASS INDEX: 21.6 KG/M2 | OXYGEN SATURATION: 97 % | WEIGHT: 150.9 LBS | RESPIRATION RATE: 16 BRPM | HEIGHT: 70 IN

## 2019-09-12 DIAGNOSIS — F10.10 ETOH ABUSE: ICD-10-CM

## 2019-09-12 DIAGNOSIS — R25.1 TREMORS OF NERVOUS SYSTEM: ICD-10-CM

## 2019-09-12 DIAGNOSIS — R56.9 CONVULSIONS, UNSPECIFIED CONVULSION TYPE (HCC): ICD-10-CM

## 2019-09-12 DIAGNOSIS — D64.9 ANEMIA, UNSPECIFIED TYPE: ICD-10-CM

## 2019-09-12 DIAGNOSIS — J45.909 MILD ASTHMA WITHOUT COMPLICATION, UNSPECIFIED WHETHER PERSISTENT: ICD-10-CM

## 2019-09-12 DIAGNOSIS — I10 ESSENTIAL HYPERTENSION: Primary | ICD-10-CM

## 2019-09-12 NOTE — PROGRESS NOTES
SPORTS MEDICINE AND PRIMARY CARE  Adela Tai MD, [address] [address]  Phone:  [phone]  Fax: [phone]       Chief Complaint   Patient presents with    Hypertension   . SUBJECTIVE:    Mary Mederos is a 75 y.o. male Patient returns today with known history of primary hypertension, hemorrhoids, alcohol abuse, seizure disorder, asthma, anemia, and is seen for evaluation. Patient returns today voicing no new complaints and is seen for evaluation. Current Outpatient Medications   Medication Sig Dispense Refill    metoprolol tartrate (LOPRESSOR) 50 mg tablet TAKE ONE TABLET BY MOUTH TWICE DAILY 60 Tab 11    amLODIPine (NORVASC) 5 mg tablet TAKE ONE TABLET BY MOUTH DAILY 30 Tab 11    traZODone (DESYREL) 100 mg tablet TAKE ONE TABLET BY MOUTH EVERY NIGHT 30 Tab 11    folic acid (FOLVITE) 1 mg tablet TAKE ONE TABLET BY MOUTH EVERY DAY 30 Tab 11    thiamine HCL (VITAMIN B-1) 100 mg tablet TAKE ONE TABLET BY MOUTH EVERY DAY 30 Tab 11    THERAPY M 9 mg iron-400 mcg tab tablet TAKE ONE TABLET BY MOUTH EVERY  Tab 3    albuterol (PROAIR HFA) 90 mcg/actuation inhaler Take 2 Puffs by inhalation every four (4) hours as needed for Wheezing or Shortness of Breath. 1 Inhaler 11    levETIRAcetam (KEPPRA) 500 mg tablet Take 1 Tab by mouth two (2) times a day. 60 Tab 11    therapeutic multivitamin (THERAGRAN) tablet Take 1 Tab by mouth daily.  30 Tab 3     Past Medical History:   Diagnosis Date    Anemia     Depression     Hx of cholecystectomy 09/12/2018    homar borrero md rt    Hypertension      Past Surgical History:   Procedure Laterality Date    HX COLONOSCOPY  at age 44     Allergies   Allergen Reactions    Other Medication Hives     Ketchup(cheap brands) causes hives         REVIEW OF SYSTEMS:  General: negative for - chills or fever  ENT: negative for - headaches, nasal congestion or tinnitus  Respiratory: negative for - cough, hemoptysis, shortness of breath or wheezing  Cardiovascular : negative for - chest pain, edema, palpitations or shortness of breath  Gastrointestinal: negative for - abdominal pain, blood in stools, heartburn or nausea/vomiting  Genito-Urinary: no dysuria, trouble voiding, or hematuria  Musculoskeletal: negative for - gait disturbance, joint pain, joint stiffness or joint swelling  Neurological: no TIA or stroke symptoms  Hematologic: no bruises, no bleeding, no swollen glands  Integument: no lumps, mole changes, nail changes or rash  Endocrine: no malaise/lethargy or unexpected weight changes      Social History     Socioeconomic History    Marital status: SINGLE     Spouse name: Not on file    Number of children: 1    Years of education: Not on file    Highest education level: Not on file   Occupational History     Comment: on disbiltiy arthritis and depression    Tobacco Use    Smoking status: Never Smoker    Smokeless tobacco: Never Used   Substance and Sexual Activity    Alcohol use: Yes     Alcohol/week: 2.0 standard drinks     Types: 2 Cans of beer per week     Comment: daily    Drug use: No     Comment: denies     Sexual activity: Not Currently     Partners: Female     Birth control/protection: Condom, None     Comment: sister is CHRISTEN Nur [phone]   Social History Narrative    Habits:  He states he only drinks one to two beers a day, however his sister cannot confirm that. She states he has gin plus a 20 ounce beer on a regular basis. The records reflect a long history of alcoholism. He denies cigarette abuse and drug abuse.         Social History:  The patient states with his brother with Osbaldo Feldman checking on both of them and making sure he takes his medications and gets his appointments. He completed the 12th grade. He is disabled due to depression since 2007. He has a 39year old daughter and three grandchildren. The patient is single.   He previously worked in the kitchen at [employer]         Family History:  Father  in his 45s of alcohol abuse. Mother  in her 62s of alcohol abuse. Eight siblings are alive and well. One sister  of alcohol and its complications             Family History   Problem Relation Age of Onset    Alcohol abuse Mother     Alcohol abuse Father     Hypertension Sister        OBJECTIVE:    Visit Vitals  /78   Pulse 60   Temp 97.8 °F (36.6 °C) (Oral)   Resp 16   Ht 5' 10\" (1.778 m)   Wt 150 lb 14.4 oz (68.4 kg)   SpO2 97%   BMI 21.65 kg/m²     CONSTITUTIONAL: well , well nourished, appears age appropriate  EYES: perrla, eom intact  ENMT:moist mucous membranes, pharynx clear  NECK: supple. Thyroid normal  RESPIRATORY: Chest: clear bilaterally   CARDIOVASCULAR: Heart: regular rate and rhythm  GASTROINTESTINAL: Abdomen: soft, bowel sounds active  HEMATOLOGIC: no pathological lymph nodes palpated  MUSCULOSKELETAL: Extremities: no edema, pulse 1+   INTEGUMENT: No unusual rashes or suspicious skin lesions noted. Nails appear normal.  NEUROLOGIC: non-focal exam   MENTAL STATUS: alert and oriented, appropriate affect           ASSESSMENT:  1. Essential hypertension    2. Tremors of nervous system    3. ETOH abuse    4. Convulsions, unspecified convulsion type ([address]    5. Mild asthma without complication, unspecified whether persistent    6. Anemia, unspecified type      Blood pressure is 140/80, which is acceptable. We would like to see it a little lower of this. I am not sure he will check it at home, so therefore we will not make any changes. He has had tremors and still has them, all related to alcohol abuse. Alcohol abuse ______________. He claims to only be a weekend drinker, only has one to two drinks, but I do not think that is the case and I think it is much more than that. Fortunately he has had no seizures since we last saw him. Bronchospasm is lacking today.     Will check his CBC today, I am concerned about his anemia, particularly in view of his alcohol abuse. He will return to the office in four months, at that time a complete metabolic evaluation will be undertaken. I have discussed the diagnosis with the patient and the intended plan as seen in the  orders above. The patient understands and agees with the plan. The patient has   received an after visit summary and questions were answered concerning  future plans  Patient labs and/or xrays were reviewed  Past records were reviewed. PLAN:  . No orders of the defined types were placed in this encounter. Follow-up and Dispositions    · Return in about 4 months (around 1/12/2020). ATTENTION:   This medical record was transcribed using an electronic medical records system. Although proofread, it may and can contain electronic and spelling errors. Other human spelling and other errors may be present. Corrections may be executed at a later time. Please feel free to contact us for any clarifications as needed.

## 2019-09-12 NOTE — PROGRESS NOTES
1. Have you been to the ER, urgent care clinic since your last visit? Hospitalized since your last visit? No    2. Have you seen or consulted any other health care providers outside of the [address] [place] [address] since your last visit? Include any pap smears or colon screening.  No

## 2019-09-13 LAB
BASOPHILS # BLD AUTO: 0.1 X10E3/UL (ref 0–0.2)
BASOPHILS NFR BLD AUTO: 1 %
EOSINOPHIL # BLD AUTO: 0.5 X10E3/UL (ref 0–0.4)
EOSINOPHIL NFR BLD AUTO: 8 %
ERYTHROCYTE [DISTWIDTH] IN BLOOD BY AUTOMATED COUNT: 13.8 % (ref 12.3–15.4)
HCT VFR BLD AUTO: 32.8 % (ref 37.5–51)
HGB BLD-MCNC: 11.1 G/DL (ref 13–17.7)
IMM GRANULOCYTES # BLD AUTO: 0 X10E3/UL (ref 0–0.1)
IMM GRANULOCYTES NFR BLD AUTO: 0 %
LYMPHOCYTES # BLD AUTO: 3.6 X10E3/UL (ref 0.7–3.1)
LYMPHOCYTES NFR BLD AUTO: 56 %
MCH RBC QN AUTO: 30.6 PG (ref 26.6–33)
MCHC RBC AUTO-ENTMCNC: 33.8 G/DL (ref 31.5–35.7)
MCV RBC AUTO: 90 FL (ref 79–97)
MONOCYTES # BLD AUTO: 0.8 X10E3/UL (ref 0.1–0.9)
MONOCYTES NFR BLD AUTO: 12 %
NEUTROPHILS # BLD AUTO: 1.5 X10E3/UL (ref 1.4–7)
NEUTROPHILS NFR BLD AUTO: 23 %
PLATELET # BLD AUTO: 252 X10E3/UL (ref 150–450)
RBC # BLD AUTO: 3.63 X10E6/UL (ref 4.14–5.8)
WBC # BLD AUTO: 6.4 X10E3/UL (ref 3.4–10.8)

## 2019-09-23 RX ORDER — LEVETIRACETAM 500 MG/1
TABLET ORAL
Qty: 60 TAB | Refills: 11 | Status: SHIPPED | OUTPATIENT
Start: 2019-09-23 | End: 2020-06-17

## 2019-12-03 RX ORDER — ALBUTEROL SULFATE 90 UG/1
AEROSOL, METERED RESPIRATORY (INHALATION)
Qty: 18 G | Refills: 11 | Status: SHIPPED | OUTPATIENT
Start: 2019-12-03 | End: 2020-06-17

## 2020-02-17 ENCOUNTER — OFFICE VISIT (OUTPATIENT)
Dept: INTERNAL MEDICINE CLINIC | Age: 65
End: 2020-02-17

## 2020-02-17 VITALS
HEART RATE: 70 BPM | DIASTOLIC BLOOD PRESSURE: 79 MMHG | SYSTOLIC BLOOD PRESSURE: 129 MMHG | OXYGEN SATURATION: 91 % | WEIGHT: 157.3 LBS | TEMPERATURE: 97.5 F | HEIGHT: 70 IN | RESPIRATION RATE: 18 BRPM | BODY MASS INDEX: 22.52 KG/M2

## 2020-02-17 DIAGNOSIS — R31.29 OTHER MICROSCOPIC HEMATURIA: ICD-10-CM

## 2020-02-17 DIAGNOSIS — R79.9 ABNORMAL FINDING OF BLOOD CHEMISTRY, UNSPECIFIED: ICD-10-CM

## 2020-02-17 DIAGNOSIS — F32.A MILD DEPRESSION: ICD-10-CM

## 2020-02-17 DIAGNOSIS — J45.909 MILD ASTHMA WITHOUT COMPLICATION, UNSPECIFIED WHETHER PERSISTENT: ICD-10-CM

## 2020-02-17 DIAGNOSIS — Z13.39 SCREENING FOR ALCOHOLISM: ICD-10-CM

## 2020-02-17 DIAGNOSIS — Z13.31 SCREENING FOR DEPRESSION: ICD-10-CM

## 2020-02-17 DIAGNOSIS — D64.9 ANEMIA, UNSPECIFIED TYPE: ICD-10-CM

## 2020-02-17 DIAGNOSIS — R56.9 CONVULSIONS, UNSPECIFIED CONVULSION TYPE (HCC): ICD-10-CM

## 2020-02-17 DIAGNOSIS — Z00.00 MEDICARE ANNUAL WELLNESS VISIT, SUBSEQUENT: Primary | ICD-10-CM

## 2020-02-17 DIAGNOSIS — F10.10 ETOH ABUSE: ICD-10-CM

## 2020-02-17 DIAGNOSIS — I10 BENIGN ESSENTIAL HTN: ICD-10-CM

## 2020-02-17 PROBLEM — Z53.20 COLONOSCOPY REFUSED: Status: ACTIVE | Noted: 2020-02-17

## 2020-02-17 NOTE — ASSESSMENT & PLAN NOTE
This condition is managed by Specialist.  Lab Results   Component Value Date/Time    WBC 6.4 09/12/2019 10:28 AM    HGB 11.1 09/12/2019 10:28 AM    HCT 32.8 09/12/2019 10:28 AM    PLATELET 448 81/46/0804 10:28 AM    Creatinine 0.98 11/01/2018 11:35 AM    BUN 18 11/01/2018 11:35 AM    Potassium 4.9 11/01/2018 11:35 AM

## 2020-02-17 NOTE — PROGRESS NOTES
SPORTS MEDICINE AND PRIMARY CARE  Sweetie Wheeler MD, [address] [address]  Phone:  [phone]  Fax: [phone]      Chief Complaint   Patient presents with    Annual Wellness Visit         SUBECTIVE:    Janay Jean Baptiste is a 75 y.o. male Patient returns today with his sister, Fausto Dior. He has a known history of anemia, asthma, primary hypertension, seizure disorder, alcohol abuse, mild depression, and is seen for evaluation. He continues to drink alcohol. Denies specific complaints. Current Outpatient Medications   Medication Sig Dispense Refill    PROAIR HFA 90 mcg/actuation inhaler INHALE 2 PUFFS BY MOUTH EVERY 4 HOURS AS NEEDED FOR WHEEZING OR SHORTNESS OF BREATH 18 g 11    levETIRAcetam (KEPPRA) 500 mg tablet TAKE ONE TABLET BY MOUTH TWICE DAILY 60 Tab 11    metoprolol tartrate (LOPRESSOR) 50 mg tablet TAKE ONE TABLET BY MOUTH TWICE DAILY 60 Tab 11    amLODIPine (NORVASC) 5 mg tablet TAKE ONE TABLET BY MOUTH DAILY 30 Tab 11    traZODone (DESYREL) 100 mg tablet TAKE ONE TABLET BY MOUTH EVERY NIGHT 30 Tab 11    folic acid (FOLVITE) 1 mg tablet TAKE ONE TABLET BY MOUTH EVERY DAY 30 Tab 11    thiamine HCL (VITAMIN B-1) 100 mg tablet TAKE ONE TABLET BY MOUTH EVERY DAY 30 Tab 11    THERAPY M 9 mg iron-400 mcg tab tablet TAKE ONE TABLET BY MOUTH EVERY  Tab 3    therapeutic multivitamin (THERAGRAN) tablet Take 1 Tab by mouth daily. 30 Tab 3     Past Medical History:   Diagnosis Date    Anemia     Colonoscopy refused 02/17/2020    Depression     Hx of cholecystectomy 09/12/2018    homar borrero md rt    Hypertension      Past Surgical History:   Procedure Laterality Date    HX COLONOSCOPY  at age 44     Allergies   Allergen Reactions    Other Medication Hives     Ketchup(cheap brands) causes hives       REVIEW OF SYSTEMS:   No history of seizures, no chest pain.         Social History     Socioeconomic History    Marital status: SINGLE     Spouse name: Not on file  Number of children: 1    Years of education: Not on file    Highest education level: Not on file   Occupational History     Comment: on disbiltiy arthritis and depression    Tobacco Use    Smoking status: Never Smoker    Smokeless tobacco: Never Used   Substance and Sexual Activity    Alcohol use: Yes     Alcohol/week: 2.0 standard drinks     Types: 2 Cans of beer per week     Comment: daily    Drug use: No     Comment: denies     Sexual activity: Not Currently     Partners: Female     Birth control/protection: Condom, None     Comment: sister is CHRISTEN Parry [phone]   Social History Narrative    Habits:  He states he only drinks one to two beers a day, however his sister cannot confirm that. She states he has gin plus a 20 ounce beer on a regular basis. The records reflect a long history of alcoholism. He denies cigarette abuse and drug abuse.         Social History:  The patient states with his brother with Joel Pillai checking on both of them and making sure he takes his medications and gets his appointments. He completed the 12th grade. He is disabled due to depression since . He has a 39year old daughter and three grandchildren. The patient is single. He previously worked in the kitchen at [employer]         Family History:  Father  in his 45s of alcohol abuse. Mother  in her 62s of alcohol abuse. Eight siblings are alive and well. One sister  of alcohol and its complications           r  Family History   Problem Relation Age of Onset    Alcohol abuse Mother     Alcohol abuse Father     Hypertension Sister        OBJECTIVE:  Visit Vitals  /79   Pulse 70   Temp 97.5 °F (36.4 °C) (Oral)   Resp 18   Ht 5' 10\" (1.778 m)   Wt 157 lb 4.8 oz (71.4 kg)   SpO2 91%   BMI 22.57 kg/m²     ENT: perrla,  eom intact  NECK: supple.  Thyroid normal  CHEST: clear to ascultation and percussion   HEART: regular rate and rhythm  ABD: soft, bowel sounds active  EXTREMITIES: no edema, pulse 1+     No visits with results within 3 Month(s) from this visit. Latest known visit with results is:   Office Visit on 09/12/2019   Component Date Value Ref Range Status    WBC 09/12/2019 6.4  3.4 - 10.8 x10E3/uL Final    RBC 09/12/2019 3.63* 4.14 - 5.80 x10E6/uL Final    HGB 09/12/2019 11.1* 13.0 - 17.7 g/dL Final    HCT 09/12/2019 32.8* 37.5 - 51.0 % Final    MCV 09/12/2019 90  79 - 97 fL Final    MCH 09/12/2019 30.6  26.6 - 33.0 pg Final    MCHC 09/12/2019 33.8  31.5 - 35.7 g/dL Final    RDW 09/12/2019 13.8  12.3 - 15.4 % Final    PLATELET 57/13/2232 596  150 - 450 x10E3/uL Final    NEUTROPHILS 09/12/2019 23  Not Estab. % Final    Lymphocytes 09/12/2019 56  Not Estab. % Final    MONOCYTES 09/12/2019 12  Not Estab. % Final    EOSINOPHILS 09/12/2019 8  Not Estab. % Final    BASOPHILS 09/12/2019 1  Not Estab. % Final    ABS. NEUTROPHILS 09/12/2019 1.5  1.4 - 7.0 x10E3/uL Final    Abs Lymphocytes 09/12/2019 3.6* 0.7 - 3.1 x10E3/uL Final    ABS. MONOCYTES 09/12/2019 0.8  0.1 - 0.9 x10E3/uL Final    ABS. EOSINOPHILS 09/12/2019 0.5* 0.0 - 0.4 x10E3/uL Final    ABS. BASOPHILS 09/12/2019 0.1  0.0 - 0.2 x10E3/uL Final    IMMATURE GRANULOCYTES 09/12/2019 0  Not Estab. % Final    ABS. IMM. GRANS. 09/12/2019 0.0  0.0 - 0.1 x10E3/uL Final          ASSESSMENT:  1. Medicare annual wellness visit, subsequent    2. Screening for alcoholism    3. Screening for depression    4. Benign essential HTN    5. Mild depression ([facility] [address]    6. ETOH abuse    7. Convulsions, unspecified convulsion type ([facility] [address] [facility]    8. Mild asthma without complication, unspecified whether persistent    9. Anemia, unspecified type    10. Other microscopic hematuria     11. Abnormal finding of blood chemistry, unspecified       Blood pressure control is adequate. No adjustments are needed. Mild depression is not evident today. Alcohol abuse remains a major issue. He reeks of alcohol today. No seizures. Bronchospasm is not present. Will check a CBC today, as well as metabolic evaluation. He will return to the office in six months, sooner if he has any problems. Again we ask him to stop alcohol abuse. I have discussed the diagnosis with the patient and the intended plan as seen in the  orders above. The patient understands and agees with the plan. The patient has   received an after visit summary and questions were answered concerning  future plans  Patient labs and/or xrays were reviewed  Past records were reviewed. PLAN:  .  Orders Placed This Encounter    Depression Screen Annual    URINALYSIS W/ RFLX MICROSCOPIC    CBC WITH AUTOMATED DIFF    METABOLIC PANEL, COMPREHENSIVE    LIPID PANEL    PROSTATE SPECIFIC AG    HEMOGLOBIN A1C WITH EAG    REFERRAL TO OPHTHALMOLOGY    AMB POC FECAL OCCULT BLOOD (INSURE FIT)       Follow-up and Dispositions    · Return in about 6 months (around 8/17/2020). ATTENTION:   This medical record was transcribed using an electronic medical records system. Although proofread, it may and can contain electronic and spelling errors. Other human spelling and other errors may be present. Corrections may be executed at a later time. Please feel free to contact us for any clarifications as needed.

## 2020-02-17 NOTE — ASSESSMENT & PLAN NOTE
This condition is managed by Specialist.  Key Psychotherapeutic Meds             traZODone (DESYREL) 100 mg tablet (Taking) TAKE ONE TABLET BY MOUTH EVERY NIGHT    folic acid (FOLVITE) 1 mg tablet (Taking) TAKE ONE TABLET BY MOUTH EVERY DAY        Other Key Behavioral Health Meds             levETIRAcetam (KEPPRA) 500 mg tablet (Taking) TAKE ONE TABLET BY MOUTH TWICE DAILY        Lab Results   Component Value Date/Time    Sodium 135 11/01/2018 11:35 AM    Creatinine 0.98 11/01/2018 11:35 AM    WBC 6.4 09/12/2019 10:28 AM    ALT (SGPT) 8 11/01/2018 11:35 AM    AST (SGOT) 40 11/01/2018 11:35 AM

## 2020-02-17 NOTE — PATIENT INSTRUCTIONS
Medicare Wellness Visit, Male The best way to live healthy is to have a lifestyle where you eat a well-balanced diet, exercise regularly, limit alcohol use, and quit all forms of tobacco/nicotine, if applicable. Regular preventive services are another way to keep healthy. Preventive services (vaccines, screening tests, monitoring & exams) can help personalize your care plan, which helps you manage your own care. Screening tests can find health problems at the earliest stages, when they are easiest to treat. [facility] follows the current, evidence-based guidelines published by the [facility] ([facility]) when recommending preventive services for our patients. Because we follow these guidelines, sometimes recommendations change over time as research supports it. (For example, a prostate screening blood test is no longer routinely recommended for men with no symptoms). Of course, you and your doctor may decide to screen more often for some diseases, based on your risk and co-morbidities (chronic disease you are already diagnosed with). Preventive services for you include: - Medicare offers their members a free annual wellness visit, which is time for you and your primary care provider to discuss and plan for your preventive service needs. Take advantage of this benefit every year! 
-All adults over age 72 should receive the recommended pneumonia vaccines. Current USPSTF guidelines recommend a series of two vaccines for the best pneumonia protection.  
-All adults should have a flu vaccine yearly and tetanus vaccine every 10 years. 
-All adults age 48 and older should receive the shingles vaccines (series of two vaccines).       
-All adults age 38-68 who are overweight should have a diabetes screening test once every three years.  
-Other screening tests & preventive services for persons with diabetes include: an eye exam to screen for diabetic retinopathy, a kidney function test, a foot exam, and stricter control over your cholesterol.  
-Cardiovascular screening for adults with routine risk involves an electrocardiogram (ECG) at intervals determined by the provider.  
-Colorectal cancer screening should be done for adults age 54-65 with no increased risk factors for colorectal cancer. There are a number of acceptable methods of screening for this type of cancer. Each test has its own benefits and drawbacks. Discuss with your provider what is most appropriate for you during your annual wellness visit. The different tests include: colonoscopy (considered the best screening method), a fecal occult blood test, a fecal DNA test, and sigmoidoscopy. 
-All adults born between [facility] should be screened once for Hepatitis C. 
-An Abdominal Aortic Aneurysm (AAA) Screening is recommended for men age 73-68 who has ever smoked in their lifetime. Here is a list of your current Health Maintenance items (your personalized list of preventive services) with a due date: 
Health Maintenance Due Topic Date Due  
 Colonoscopy  06/28/1971  Shingles Vaccine (1 of 2) 06/28/2003  Glaucoma Screening   06/28/2018 Phyllis Marc Annual Well Visit  11/02/2019

## 2020-02-17 NOTE — ACP (ADVANCE CARE PLANNING)

## 2020-02-17 NOTE — PROGRESS NOTES
1. Have you been to the ER, urgent care clinic since your last visit? Hospitalized since your last visit? No    2. Have you seen or consulted any other health care providers outside of the [address] [place] [address] since your last visit? Include any pap smears or colon screening. No       This is the Subsequent Medicare Annual Wellness Exam, performed 12 months or more after the Initial AWV or the last Subsequent AWV    I have reviewed the patient's medical history in detail and updated the computerized patient record.      History     Patient Active Problem List   Diagnosis Code    Tremors of nervous system R25.1    ETOH abuse F10.10    Asthma J45.909    Benign essential HTN I10    Convulsions ([facility] R56.9    Anemia, unspecified D64.9    Unspecified dementia without behavioral disturbance ([facility] F03.90    Mild intermittent asthma without complication W33.47    Advance directive discussed with patient Z70.80    Advanced care planning/counseling discussion Z71.89    Asthma, mild intermittent, well-controlled J45.20    Hypertension I10    Depression F32.9    Anemia D64.9    Mild depression (HCC) F32.0    Hx of cholecystectomy Z90.49     Past Medical History:   Diagnosis Date    Anemia     Depression     Hx of cholecystectomy 09/12/2018    homar borrero md rt    Hypertension       Past Surgical History:   Procedure Laterality Date    HX COLONOSCOPY  at age 44     Current Outpatient Medications   Medication Sig Dispense Refill    PROAIR HFA 90 mcg/actuation inhaler INHALE 2 PUFFS BY MOUTH EVERY 4 HOURS AS NEEDED FOR WHEEZING OR SHORTNESS OF BREATH 18 g 11    levETIRAcetam (KEPPRA) 500 mg tablet TAKE ONE TABLET BY MOUTH TWICE DAILY 60 Tab 11    metoprolol tartrate (LOPRESSOR) 50 mg tablet TAKE ONE TABLET BY MOUTH TWICE DAILY 60 Tab 11    amLODIPine (NORVASC) 5 mg tablet TAKE ONE TABLET BY MOUTH DAILY 30 Tab 11    traZODone (DESYREL) 100 mg tablet TAKE ONE TABLET BY MOUTH EVERY NIGHT 30 Tab 11    folic acid (FOLVITE) 1 mg tablet TAKE ONE TABLET BY MOUTH EVERY DAY 30 Tab 11    thiamine HCL (VITAMIN B-1) 100 mg tablet TAKE ONE TABLET BY MOUTH EVERY DAY 30 Tab 11    THERAPY M 9 mg iron-400 mcg tab tablet TAKE ONE TABLET BY MOUTH EVERY  Tab 3    therapeutic multivitamin (THERAGRAN) tablet Take 1 Tab by mouth daily. 30 Tab 3     Allergies   Allergen Reactions    Other Medication Hives     Ketchup(cheap brands) causes hives       Family History   Problem Relation Age of Onset    Alcohol abuse Mother     Alcohol abuse Father     Hypertension Sister      Social History     Tobacco Use    Smoking status: Never Smoker    Smokeless tobacco: Never Used   Substance Use Topics    Alcohol use: Yes     Alcohol/week: 2.0 standard drinks     Types: 2 Cans of beer per week     Comment: daily       Depression Risk Factor Screening:     3 most recent PHQ Screens 10/12/2017   Little interest or pleasure in doing things Not at all   Feeling down, depressed, irritable, or hopeless Not at all   Total Score PHQ 2 0       Alcohol Risk Factor Screening (MALE > 65): Do you average more 1 drink per night or more than 7 drinks a week: No    In the past three months have you have had more than 4 drinks containing alcohol on one occasion: No      Functional Ability and Level of Safety:   Hearing: Hearing is good. Activities of Daily Living: The home contains: no safety equipment. Patient does total self care    Ambulation: with no difficulty    Fall Risk:  Fall Risk Assessment, last 12 mths 2/17/2020   Able to walk? Yes   Fall in past 12 months?  No       Abuse Screen:  Patient is not abused    Cognitive Screening   Has your family/caregiver stated any concerns about your memory: no  Cognitive Screening: Normal - MMSE (Mini Mental Status Exam)    Patient Care Team   Patient Care Team:  Lawrence Elder MD as PCP - General (Internal Medicine)  Lawrence Elder MD as PCP - [facility] Empaneled Provider  India Rascon MORALES HINOJOSAN as Nurse Navigator  Nelda Brown, RN as Nurse Navigator    Assessment/Plan   Education and counseling provided:  Are appropriate based on today's review and evaluation        Health Maintenance Due   Topic Date Due    Colonoscopy  06/28/1971    Shingrix Vaccine Age 50> (1 of 2) 06/28/2003    GLAUCOMA SCREENING Q2Y  06/28/2018    Medicare Yearly Exam  11/02/2019

## 2020-02-18 LAB
ALBUMIN SERPL-MCNC: 4.2 G/DL (ref 3.8–4.8)
ALBUMIN/GLOB SERPL: 1 {RATIO} (ref 1.2–2.2)
ALP SERPL-CCNC: 85 IU/L (ref 39–117)
ALT SERPL-CCNC: 18 IU/L (ref 0–44)
APPEARANCE UR: CLEAR
AST SERPL-CCNC: 51 IU/L (ref 0–40)
BASOPHILS # BLD AUTO: 0 X10E3/UL (ref 0–0.2)
BASOPHILS NFR BLD AUTO: 1 %
BILIRUB SERPL-MCNC: 0.2 MG/DL (ref 0–1.2)
BILIRUB UR QL STRIP: NEGATIVE
BUN SERPL-MCNC: 12 MG/DL (ref 8–27)
BUN/CREAT SERPL: 12 (ref 10–24)
CALCIUM SERPL-MCNC: 9.4 MG/DL (ref 8.6–10.2)
CHLORIDE SERPL-SCNC: 93 MMOL/L (ref 96–106)
CHOLEST SERPL-MCNC: 194 MG/DL (ref 100–199)
CO2 SERPL-SCNC: 17 MMOL/L (ref 20–29)
COLOR UR: YELLOW
CREAT SERPL-MCNC: 1 MG/DL (ref 0.76–1.27)
EOSINOPHIL # BLD AUTO: 0 X10E3/UL (ref 0–0.4)
EOSINOPHIL NFR BLD AUTO: 1 %
ERYTHROCYTE [DISTWIDTH] IN BLOOD BY AUTOMATED COUNT: 13.4 % (ref 11.6–15.4)
EST. AVERAGE GLUCOSE BLD GHB EST-MCNC: 105 MG/DL
GLOBULIN SER CALC-MCNC: 4.2 G/DL (ref 1.5–4.5)
GLUCOSE SERPL-MCNC: 82 MG/DL (ref 65–99)
GLUCOSE UR QL: NEGATIVE
HBA1C MFR BLD: 5.3 % (ref 4.8–5.6)
HCT VFR BLD AUTO: 36.1 % (ref 37.5–51)
HDLC SERPL-MCNC: 76 MG/DL
HGB BLD-MCNC: 12.4 G/DL (ref 13–17.7)
HGB UR QL STRIP: NEGATIVE
IMM GRANULOCYTES # BLD AUTO: 0 X10E3/UL (ref 0–0.1)
IMM GRANULOCYTES NFR BLD AUTO: 1 %
KETONES UR QL STRIP: ABNORMAL
LDLC SERPL CALC-MCNC: 102 MG/DL (ref 0–99)
LEUKOCYTE ESTERASE UR QL STRIP: NEGATIVE
LYMPHOCYTES # BLD AUTO: 2.6 X10E3/UL (ref 0.7–3.1)
LYMPHOCYTES NFR BLD AUTO: 40 %
MCH RBC QN AUTO: 31.1 PG (ref 26.6–33)
MCHC RBC AUTO-ENTMCNC: 34.3 G/DL (ref 31.5–35.7)
MCV RBC AUTO: 91 FL (ref 79–97)
MICRO URNS: ABNORMAL
MONOCYTES # BLD AUTO: 0.6 X10E3/UL (ref 0.1–0.9)
MONOCYTES NFR BLD AUTO: 9 %
NEUTROPHILS # BLD AUTO: 3.3 X10E3/UL (ref 1.4–7)
NEUTROPHILS NFR BLD AUTO: 48 %
NITRITE UR QL STRIP: NEGATIVE
PH UR STRIP: 5 [PH] (ref 5–7.5)
PLATELET # BLD AUTO: 450 X10E3/UL (ref 150–450)
POTASSIUM SERPL-SCNC: 4.4 MMOL/L (ref 3.5–5.2)
PROT SERPL-MCNC: 8.4 G/DL (ref 6–8.5)
PROT UR QL STRIP: ABNORMAL
PSA SERPL-MCNC: 0.4 NG/ML (ref 0–4)
RBC # BLD AUTO: 3.99 X10E6/UL (ref 4.14–5.8)
SODIUM SERPL-SCNC: 136 MMOL/L (ref 134–144)
SP GR UR: 1.01 (ref 1–1.03)
TRIGL SERPL-MCNC: 79 MG/DL (ref 0–149)
UROBILINOGEN UR STRIP-MCNC: 0.2 MG/DL (ref 0.2–1)
VLDLC SERPL CALC-MCNC: 16 MG/DL (ref 5–40)
WBC # BLD AUTO: 6.6 X10E3/UL (ref 3.4–10.8)

## 2020-03-25 RX ORDER — TRAZODONE HYDROCHLORIDE 100 MG/1
TABLET ORAL
Qty: 30 TAB | Refills: 11 | Status: SHIPPED | OUTPATIENT
Start: 2020-03-25 | End: 2021-01-27

## 2020-03-25 RX ORDER — AMLODIPINE BESYLATE 5 MG/1
TABLET ORAL
Qty: 30 TAB | Refills: 11 | Status: SHIPPED | OUTPATIENT
Start: 2020-03-25 | End: 2021-01-27

## 2020-03-25 RX ORDER — LANOLIN ALCOHOL/MO/W.PET/CERES
CREAM (GRAM) TOPICAL
Qty: 30 TAB | Refills: 11 | Status: SHIPPED | OUTPATIENT
Start: 2020-03-25 | End: 2021-01-27

## 2020-03-25 RX ORDER — FOLIC ACID 1 MG/1
TABLET ORAL
Qty: 30 TAB | Refills: 11 | Status: SHIPPED | OUTPATIENT
Start: 2020-03-25 | End: 2021-01-27

## 2020-03-25 RX ORDER — METOPROLOL TARTRATE 50 MG/1
TABLET ORAL
Qty: 60 TAB | Refills: 11 | Status: SHIPPED | OUTPATIENT
Start: 2020-03-25 | End: 2021-01-27

## 2020-06-17 RX ORDER — LEVETIRACETAM 500 MG/1
TABLET ORAL
Qty: 60 TAB | Refills: 11 | Status: SHIPPED | OUTPATIENT
Start: 2020-06-17 | End: 2021-04-15

## 2020-06-17 RX ORDER — ALBUTEROL SULFATE 90 UG/1
AEROSOL, METERED RESPIRATORY (INHALATION)
Qty: 8.5 G | Refills: 11 | Status: SHIPPED | OUTPATIENT
Start: 2020-06-17 | End: 2021-01-27

## 2020-09-29 ENCOUNTER — OFFICE VISIT (OUTPATIENT)
Dept: INTERNAL MEDICINE CLINIC | Age: 65
End: 2020-09-29
Payer: MEDICAID

## 2020-09-29 VITALS
WEIGHT: 155.5 LBS | OXYGEN SATURATION: 94 % | RESPIRATION RATE: 18 BRPM | TEMPERATURE: 97.6 F | HEIGHT: 70 IN | HEART RATE: 73 BPM | SYSTOLIC BLOOD PRESSURE: 129 MMHG | BODY MASS INDEX: 22.26 KG/M2 | DIASTOLIC BLOOD PRESSURE: 78 MMHG

## 2020-09-29 DIAGNOSIS — I10 BENIGN ESSENTIAL HTN: Primary | ICD-10-CM

## 2020-09-29 DIAGNOSIS — G40.909 SEIZURE DISORDER (HCC): ICD-10-CM

## 2020-09-29 DIAGNOSIS — D64.9 ANEMIA, UNSPECIFIED TYPE: ICD-10-CM

## 2020-09-29 DIAGNOSIS — F10.10 ALCOHOL ABUSE: ICD-10-CM

## 2020-09-29 PROCEDURE — 99214 OFFICE O/P EST MOD 30 MIN: CPT | Performed by: INTERNAL MEDICINE

## 2020-09-29 NOTE — PROGRESS NOTES
SPORTS MEDICINE AND PRIMARY CARE  Ollie Barger MD, [address] [address]  Phone:  [phone]  Fax: [phone]       Chief Complaint   Patient presents with    Pre-op Exam   .      SUBJECTIVE:    Ashley Noel is a 77 y.o. male Patient returns today with his sister, Darnell Toth, for a preoperative history and physical examination for removal of cataract and implantation of intraocular lens by Dr. Glo Arnett. Venkat Gillette MD of [facility]. Patient currently denies specific complaints and is requesting evaluation. Current Outpatient Medications   Medication Sig Dispense Refill    albuterol (PROVENTIL HFA, VENTOLIN HFA, PROAIR HFA) 90 mcg/actuation inhaler INHALE 2 PUFFS BY MOUTH EVERY 4 HOURS AS NEEDED FOR WHEEZING OR SHORTNESS OF BREATH 8.5 g 11    levETIRAcetam (KEPPRA) 500 mg tablet TAKE ONE TABLET BY MOUTH TWICE DAILY 60 Tab 11    thiamine HCL (B-1) 100 mg tablet TAKE ONE TABLET BY MOUTH EVERY DAY 30 Tab 11    amLODIPine (NORVASC) 5 mg tablet TAKE ONE TABLET BY MOUTH DAILY 30 Tab 11    folic acid (FOLVITE) 1 mg tablet TAKE ONE TABLET BY MOUTH EVERY DAY 30 Tab 11    metoprolol tartrate (LOPRESSOR) 50 mg tablet TAKE ONE TABLET BY MOUTH TWICE DAILY 60 Tab 11    traZODone (DESYREL) 100 mg tablet TAKE ONE TABLET BY MOUTH EVERY NIGHT 30 Tab 11    THERAPY M 9 mg iron-400 mcg tab tablet TAKE ONE TABLET BY MOUTH EVERY  Tab 3    therapeutic multivitamin (THERAGRAN) tablet Take 1 Tab by mouth daily.  27 Tab 3     Past Medical History:   Diagnosis Date    Alcohol abuse     Anemia     Asthma     Colonoscopy refused 02/17/2020    Depression     Hx of cholecystectomy 09/12/2018    homar borrero md rt    Hypertension     Seizure disorder [facility])      Past Surgical History:   Procedure Laterality Date    HX COLONOSCOPY  at age 44     Allergies   Allergen Reactions    Other Medication Hives     Ketchup(cheap brands) causes hives         REVIEW OF SYSTEMS:  General: negative for - chills or fever  ENT: negative for - headaches, nasal congestion or tinnitus  Respiratory: negative for - cough, hemoptysis, shortness of breath or wheezing  Cardiovascular : negative for - chest pain, edema, palpitations or shortness of breath  Gastrointestinal: negative for - abdominal pain, blood in stools, heartburn or nausea/vomiting  Genito-Urinary: no dysuria, trouble voiding, or hematuria  Musculoskeletal: negative for - gait disturbance, joint pain, joint stiffness or joint swelling  Neurological: no TIA or stroke symptoms  Hematologic: no bruises, no bleeding, no swollen glands  Integument: no lumps, mole changes, nail changes or rash  Endocrine: no malaise/lethargy or unexpected weight changes      Social History     Socioeconomic History    Marital status: SINGLE     Spouse name: Not on file    Number of children: 1    Years of education: Not on file    Highest education level: Not on file   Occupational History     Comment: on disbiltiy arthritis and depression    Tobacco Use    Smoking status: Never Smoker    Smokeless tobacco: Never Used   Substance and Sexual Activity    Alcohol use: Yes     Alcohol/week: 2.0 standard drinks     Types: 2 Cans of beer per week     Comment: daily    Drug use: No     Comment: denies     Sexual activity: Not Currently     Partners: Female     Birth control/protection: Condom, None     Comment: sister is CHRISTEN Hitchcock [facility] [phone]   Social History Narrative    Habits:  He states he only drinks one to two beers a day, however his sister cannot confirm that. She states he has gin plus a 20 ounce beer on a regular basis. The records reflect a long history of alcoholism. He denies cigarette abuse and drug abuse.         Social History:  The patient states with his brother with [employer] checking on both of them and making sure he takes his medications and gets his appointments. He completed the 12th grade. He is disabled due to depression since 2007. He has a 39year old daughter and three grandchildren. The patient is single. He previously worked in the kitchen at [employer]         Family History:  Father  in his 45s of alcohol abuse. Mother  in her 62s of alcohol abuse. Eight siblings are alive and well. One sister  of alcohol and its complications             Family History   Problem Relation Age of Onset    Alcohol abuse Mother     Alcohol abuse Father     Hypertension Sister        OBJECTIVE:    Visit Vitals  /78   Pulse 73   Temp 97.6 °F (36.4 °C) (Oral)   Resp 18   Ht 5' 10\" (1.778 m)   Wt 155 lb 8 oz (70.5 kg)   SpO2 94%   BMI 22.31 kg/m²     CONSTITUTIONAL: well , well nourished, appears age appropriate  EYES: perrla, eom intact  ENMT:moist mucous membranes, pharynx clear  NECK: supple. Thyroid normal  RESPIRATORY: Chest: clear bilaterally   CARDIOVASCULAR: Heart: regular rate and rhythm  GASTROINTESTINAL: Abdomen: soft, bowel sounds active  HEMATOLOGIC: no pathological lymph nodes palpated  MUSCULOSKELETAL: Extremities: no edema, pulse 1+   INTEGUMENT: No unusual rashes or suspicious skin lesions noted. Nails appear normal.  NEUROLOGIC: non-focal exam   MENTAL STATUS: alert and oriented, appropriate affect           ASSESSMENT:  1. Benign essential HTN    2. Alcohol abuse    3. Seizure disorder ([address]    4. Anemia, unspecified type      Blood pressure control is at goal, no titration is needed. Alcohol abuse is unabating. We certainly encourage him to stop and we have offered him suggestions and referrals in the past.    No recent seizures. He has a known history of anemia, which is related to the suppression of the bone marrow by the alcohol. We will check a CBC, as well as LFTs. He will be back to see us in four months, sooner if he has any problems. I have discussed the diagnosis with the patient and the intended plan as seen in the  Orders.   The patient understands and agees with the plan. The patient has   received an after visit summary and questions were answered concerning  future plans  Patient labs and/or xrays were reviewed  Past records were reviewed. PLAN:  . No orders of the defined types were placed in this encounter. ATTENTION:   This medical record was transcribed using an electronic medical records system. Although proofread, it may and can contain electronic and spelling errors. Other human spelling and other errors may be present. Corrections may be executed at a later time. Please feel free to contact us for any clarifications as needed.

## 2020-09-29 NOTE — PROGRESS NOTES
1. Have you been to the ER, urgent care clinic since your last visit? Hospitalized since your last visit? No    2. Have you seen or consulted any other health care providers outside of the [address] [place] [address] since your last visit? Include any pap smears or colon screening.  No     Pre op exam

## 2020-09-30 LAB
ALBUMIN SERPL-MCNC: 4.1 G/DL (ref 3.8–4.8)
ALBUMIN/GLOB SERPL: 1 {RATIO} (ref 1.2–2.2)
ALP SERPL-CCNC: 102 IU/L (ref 39–117)
ALT SERPL-CCNC: 28 IU/L (ref 0–44)
AMMONIA PLAS-MCNC: 82 UG/DL (ref 40–200)
AST SERPL-CCNC: 75 IU/L (ref 0–40)
BASOPHILS # BLD AUTO: 0.1 X10E3/UL (ref 0–0.2)
BASOPHILS NFR BLD AUTO: 1 %
BILIRUB SERPL-MCNC: 0.3 MG/DL (ref 0–1.2)
BUN SERPL-MCNC: 7 MG/DL (ref 8–27)
BUN/CREAT SERPL: 8 (ref 10–24)
CALCIUM SERPL-MCNC: 9.4 MG/DL (ref 8.6–10.2)
CHLORIDE SERPL-SCNC: 92 MMOL/L (ref 96–106)
CO2 SERPL-SCNC: 22 MMOL/L (ref 20–29)
CREAT SERPL-MCNC: 0.92 MG/DL (ref 0.76–1.27)
EOSINOPHIL # BLD AUTO: 0.1 X10E3/UL (ref 0–0.4)
EOSINOPHIL NFR BLD AUTO: 2 %
ERYTHROCYTE [DISTWIDTH] IN BLOOD BY AUTOMATED COUNT: 12.7 % (ref 11.6–15.4)
GLOBULIN SER CALC-MCNC: 4.2 G/DL (ref 1.5–4.5)
GLUCOSE SERPL-MCNC: 105 MG/DL (ref 65–99)
HCT VFR BLD AUTO: 34.3 % (ref 37.5–51)
HGB BLD-MCNC: 11.7 G/DL (ref 13–17.7)
IMM GRANULOCYTES # BLD AUTO: 0 X10E3/UL (ref 0–0.1)
IMM GRANULOCYTES NFR BLD AUTO: 0 %
LYMPHOCYTES # BLD AUTO: 3.7 X10E3/UL (ref 0.7–3.1)
LYMPHOCYTES NFR BLD AUTO: 65 %
MCH RBC QN AUTO: 30.6 PG (ref 26.6–33)
MCHC RBC AUTO-ENTMCNC: 34.1 G/DL (ref 31.5–35.7)
MCV RBC AUTO: 90 FL (ref 79–97)
MONOCYTES # BLD AUTO: 0.7 X10E3/UL (ref 0.1–0.9)
MONOCYTES NFR BLD AUTO: 12 %
NEUTROPHILS # BLD AUTO: 1.2 X10E3/UL (ref 1.4–7)
NEUTROPHILS NFR BLD AUTO: 20 %
PLATELET # BLD AUTO: 214 X10E3/UL (ref 150–450)
POTASSIUM SERPL-SCNC: 3.8 MMOL/L (ref 3.5–5.2)
PROT SERPL-MCNC: 8.3 G/DL (ref 6–8.5)
RBC # BLD AUTO: 3.82 X10E6/UL (ref 4.14–5.8)
SODIUM SERPL-SCNC: 131 MMOL/L (ref 134–144)
WBC # BLD AUTO: 5.7 X10E3/UL (ref 3.4–10.8)

## 2021-01-27 RX ORDER — ALBUTEROL SULFATE 90 UG/1
AEROSOL, METERED RESPIRATORY (INHALATION)
Qty: 8.5 G | Refills: 11 | Status: SHIPPED | OUTPATIENT
Start: 2021-01-27 | End: 2021-11-17

## 2021-01-27 RX ORDER — AMLODIPINE BESYLATE 5 MG/1
TABLET ORAL
Qty: 30 TAB | Refills: 11 | Status: SHIPPED | OUTPATIENT
Start: 2021-01-27 | End: 2021-06-04 | Stop reason: ALTCHOICE

## 2021-01-27 RX ORDER — TRAZODONE HYDROCHLORIDE 100 MG/1
TABLET ORAL
Qty: 30 TAB | Refills: 11 | Status: SHIPPED | OUTPATIENT
Start: 2021-01-27 | End: 2021-11-18 | Stop reason: SDUPTHER

## 2021-01-27 RX ORDER — FOLIC ACID 1 MG/1
TABLET ORAL
Qty: 30 TAB | Refills: 11 | Status: SHIPPED | OUTPATIENT
Start: 2021-01-27 | End: 2021-11-17

## 2021-01-27 RX ORDER — LANOLIN ALCOHOL/MO/W.PET/CERES
CREAM (GRAM) TOPICAL
Qty: 30 TAB | Refills: 11 | Status: SHIPPED | OUTPATIENT
Start: 2021-01-27 | End: 2021-11-17

## 2021-01-27 RX ORDER — METOPROLOL TARTRATE 50 MG/1
TABLET ORAL
Qty: 60 TAB | Refills: 11 | Status: SHIPPED | OUTPATIENT
Start: 2021-01-27 | End: 2021-09-03 | Stop reason: SINTOL

## 2021-02-04 ENCOUNTER — OFFICE VISIT (OUTPATIENT)
Dept: INTERNAL MEDICINE CLINIC | Age: 66
End: 2021-02-04
Payer: MEDICAID

## 2021-02-04 VITALS
HEIGHT: 70 IN | HEART RATE: 71 BPM | WEIGHT: 149.1 LBS | RESPIRATION RATE: 18 BRPM | TEMPERATURE: 96 F | SYSTOLIC BLOOD PRESSURE: 141 MMHG | DIASTOLIC BLOOD PRESSURE: 84 MMHG | OXYGEN SATURATION: 96 % | BODY MASS INDEX: 21.35 KG/M2

## 2021-02-04 DIAGNOSIS — G40.909 SEIZURE DISORDER (HCC): ICD-10-CM

## 2021-02-04 DIAGNOSIS — I10 BENIGN ESSENTIAL HTN: Primary | ICD-10-CM

## 2021-02-04 DIAGNOSIS — D64.9 ANEMIA, UNSPECIFIED TYPE: ICD-10-CM

## 2021-02-04 DIAGNOSIS — F10.10 ALCOHOL ABUSE: ICD-10-CM

## 2021-02-04 LAB
BASOPHILS # BLD: 0.1 K/UL (ref 0–0.1)
BASOPHILS NFR BLD: 1 % (ref 0–1)
CHOLEST SERPL-MCNC: 244 MG/DL
DIFFERENTIAL METHOD BLD: ABNORMAL
EOSINOPHIL # BLD: 0.1 K/UL (ref 0–0.4)
EOSINOPHIL NFR BLD: 2 % (ref 0–7)
ERYTHROCYTE [DISTWIDTH] IN BLOOD BY AUTOMATED COUNT: 15 % (ref 11.5–14.5)
HCT VFR BLD AUTO: 36.4 % (ref 36.6–50.3)
HDLC SERPL-MCNC: 130 MG/DL
HDLC SERPL: 1.9 {RATIO} (ref 0–5)
HGB BLD-MCNC: 12 G/DL (ref 12.1–17)
IMM GRANULOCYTES # BLD AUTO: 0 K/UL (ref 0–0.04)
IMM GRANULOCYTES NFR BLD AUTO: 0 % (ref 0–0.5)
LDLC SERPL CALC-MCNC: 101 MG/DL (ref 0–100)
LIPID PROFILE,FLP: ABNORMAL
LYMPHOCYTES # BLD: 2.1 K/UL (ref 0.8–3.5)
LYMPHOCYTES NFR BLD: 38 % (ref 12–49)
MCH RBC QN AUTO: 30.9 PG (ref 26–34)
MCHC RBC AUTO-ENTMCNC: 33 G/DL (ref 30–36.5)
MCV RBC AUTO: 93.8 FL (ref 80–99)
MONOCYTES # BLD: 1.1 K/UL (ref 0–1)
MONOCYTES NFR BLD: 20 % (ref 5–13)
NEUTS SEG # BLD: 2.2 K/UL (ref 1.8–8)
NEUTS SEG NFR BLD: 39 % (ref 32–75)
NRBC # BLD: 0 K/UL (ref 0–0.01)
NRBC BLD-RTO: 0 PER 100 WBC
PLATELET # BLD AUTO: 158 K/UL (ref 150–400)
PMV BLD AUTO: 11.3 FL (ref 8.9–12.9)
PSA SERPL-MCNC: 0.6 NG/ML (ref 0.01–4)
RBC # BLD AUTO: 3.88 M/UL (ref 4.1–5.7)
TRIGL SERPL-MCNC: 65 MG/DL (ref ?–150)
VLDLC SERPL CALC-MCNC: 13 MG/DL
WBC # BLD AUTO: 5.5 K/UL (ref 4.1–11.1)

## 2021-02-04 PROCEDURE — 99213 OFFICE O/P EST LOW 20 MIN: CPT | Performed by: INTERNAL MEDICINE

## 2021-02-04 NOTE — PROGRESS NOTES
SPORTS MEDICINE AND PRIMARY CARE  Jackie Ortiz MD, [address] [address]  Phone:  [phone]  Fax: [phone]       Chief Complaint   Patient presents with    Hypertension   . SUBJECTIVE:    Shamika Trujillo is a 77 y.o. male Patient comes in today with a known history of seizure disorder, alcohol abuse, anemia, primary hypertension, and is seen for evaluation. Patient returns today complaining about pains in his left knee. He tells me he is putting a cream on it and it seems to be better. His last drink was Saturday when he had a \"light beer\". Patient is seen for evaluation. Current Outpatient Medications   Medication Sig Dispense Refill    thiamine HCL (B-1) 100 mg tablet TAKE ONE TABLET BY MOUTH EVERY DAY 30 Tab 11    amLODIPine (NORVASC) 5 mg tablet TAKE ONE TABLET BY MOUTH EVERY DAY 30 Tab 11    traZODone (DESYREL) 100 mg tablet TAKE ONE TABLET BY MOUTH EVERY EVENING 30 Tab 11    metoprolol tartrate (LOPRESSOR) 50 mg tablet TAKE ONE TABLET BY MOUTH TWICE DAILY 60 Tab 11    folic acid (FOLVITE) 1 mg tablet TAKE ONE TABLET BY MOUTH EVERY DAY 30 Tab 11    albuterol (PROVENTIL HFA, VENTOLIN HFA, PROAIR HFA) 90 mcg/actuation inhaler INHALE 2 PUFFS BY MOUTH EVERY 4 HOURS AS NEEDED FOR WHEEZING OR SHORTNESS OF BREATH 8.5 g 11    levETIRAcetam (KEPPRA) 500 mg tablet TAKE ONE TABLET BY MOUTH TWICE DAILY 60 Tab 11    THERAPY M 9 mg iron-400 mcg tab tablet TAKE ONE TABLET BY MOUTH EVERY  Tab 3    therapeutic multivitamin (THERAGRAN) tablet Take 1 Tab by mouth daily.  27 Tab 3     Past Medical History:   Diagnosis Date    Alcohol abuse     Anemia     Asthma     Colonoscopy refused 02/17/2020    Depression     Hx of cholecystectomy 09/12/2018    homar borrero md rt    Hypertension     Seizure disorder [facility])      Past Surgical History:   Procedure Laterality Date    HX COLONOSCOPY  at age 44     Allergies   Allergen Reactions    Other Medication Hives Ketchup(cheap brands) causes hives         REVIEW OF SYSTEMS:  General: negative for - chills or fever  ENT: negative for - headaches, nasal congestion or tinnitus  Respiratory: negative for - cough, hemoptysis, shortness of breath or wheezing  Cardiovascular : negative for - chest pain, edema, palpitations or shortness of breath  Gastrointestinal: negative for - abdominal pain, blood in stools, heartburn or nausea/vomiting  Genito-Urinary: no dysuria, trouble voiding, or hematuria  Musculoskeletal: negative for - gait disturbance, joint pain, joint stiffness or joint swelling  Neurological: no TIA or stroke symptoms  Hematologic: no bruises, no bleeding, no swollen glands  Integument: no lumps, mole changes, nail changes or rash  Endocrine: no malaise/lethargy or unexpected weight changes      Social History     Socioeconomic History    Marital status: SINGLE     Spouse name: Not on file    Number of children: 1    Years of education: Not on file    Highest education level: Not on file   Occupational History     Comment: on disbiltiy arthritis and depression    Tobacco Use    Smoking status: Never Smoker    Smokeless tobacco: Never Used   Substance and Sexual Activity    Alcohol use: Yes     Alcohol/week: 2.0 standard drinks     Types: 2 Cans of beer per week     Frequency: 2-3 times a week     Drinks per session: 3 or 4     Comment: daily    Drug use: No     Comment: denies     Sexual activity: Not Currently     Partners: Female     Birth control/protection: Condom, None     Comment: sister is CHRISTEN [place] [phone]   Social History Narrative    Habits:  He states he only drinks one to two beers a day, however his sister cannot confirm that. She states he has gin plus a 20 ounce beer on a regular basis. The records reflect a long history of alcoholism.   He denies cigarette abuse and drug abuse.         Social History:  The patient states with his brother with [employer] checking on both of them and making sure he takes his medications and gets his appointments. He completed the 12th grade. He is disabled due to depression since . He has a 39year old daughter and three grandchildren. The patient is single. 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 Mother  in her 62s of alcohol abuse. Eight siblings are alive and well. One sister  of alcohol and its complications             Family History   Problem Relation Age of Onset    Alcohol abuse Mother     Alcohol abuse Father     Hypertension Sister        OBJECTIVE:    Visit Vitals  BP (!) 141/84   Pulse 71   Temp (!) 96 °F (35.6 °C) (Oral)   Resp 18   Ht 5' 10\" (1.778 m)   Wt 149 lb 1.6 oz (67.6 kg)   SpO2 96%   BMI 21.39 kg/m²     CONSTITUTIONAL: well , well nourished, appears age appropriate  EYES: perrla, eom intact  ENMT:moist mucous membranes, pharynx clear  NECK: supple. Thyroid normal  RESPIRATORY: Chest: clear bilaterally   CARDIOVASCULAR: Heart: regular rate and rhythm  GASTROINTESTINAL: Abdomen: soft, bowel sounds active  HEMATOLOGIC: no pathological lymph nodes palpated  MUSCULOSKELETAL: Extremities: no edema, pulse 1+   INTEGUMENT: No unusual rashes or suspicious skin lesions noted. Nails appear normal.  NEUROLOGIC: non-focal exam   MENTAL STATUS: alert and oriented, appropriate affect           ASSESSMENT:  1. Benign essential HTN    2. Anemia, unspecified type    3. Alcohol abuse    4. Seizure disorder ([address]      Blood pressure control is at the upper limits of normal, but acceptable. I suspect it drops down to normal when he is outside the office. He has anemia that I think is related to his alcohol abuse. He continues to abuse alcohol in spite of that. We encouraged him to stop completely. He has had no seizures since we last saw him and continues to take his Keppra on a regular basis.   He will be back to see us in about four months, sooner if he has any problems. I have discussed the diagnosis with the patient and the intended plan as seen in the  Orders. The patient understands and agees with the plan. The patient has   received an after visit summary and questions were answered concerning  future plans  Patient labs and/or xrays were reviewed  Past records were reviewed. PLAN:  .  Orders Placed This Encounter    PROSTATE SPECIFIC AG    LIPID PANEL    URINALYSIS W/ RFLX MICROSCOPIC    CBC WITH AUTOMATED DIFF       Follow-up and Dispositions    · Return in about 4 months (around 6/4/2021). Follow-up and Disposition History                 ATTENTION:   This medical record was transcribed using an electronic medical records system. Although proofread, it may and can contain electronic and spelling errors. Other human spelling and other errors may be present. Corrections may be executed at a later time. Please feel free to contact us for any clarifications as needed.

## 2021-02-04 NOTE — PROGRESS NOTES
1. Have you been to the ER, urgent care clinic since your last visit? Hospitalized since your last visit? No    2. Have you seen or consulted any other health care providers outside of the [address] [place] [address] since your last visit? Include any pap smears or colon screening.  No

## 2021-02-05 RX ORDER — ROSUVASTATIN CALCIUM 5 MG/1
5 TABLET, COATED ORAL
Qty: 30 TAB | Refills: 11 | Status: SHIPPED | OUTPATIENT
Start: 2021-02-05 | End: 2021-02-10 | Stop reason: SDUPTHER

## 2021-02-10 RX ORDER — ROSUVASTATIN CALCIUM 5 MG/1
5 TABLET, COATED ORAL
Qty: 90 TAB | Refills: 3 | Status: SHIPPED | OUTPATIENT
Start: 2021-02-10 | End: 2021-11-18 | Stop reason: SDUPTHER

## 2021-04-15 RX ORDER — LEVETIRACETAM 500 MG/1
TABLET ORAL
Qty: 60 TAB | Refills: 11 | Status: SHIPPED | OUTPATIENT
Start: 2021-04-15 | End: 2022-02-03 | Stop reason: SDUPTHER

## 2021-06-04 ENCOUNTER — OFFICE VISIT (OUTPATIENT)
Dept: INTERNAL MEDICINE CLINIC | Age: 66
End: 2021-06-04
Payer: MEDICAID

## 2021-06-04 VITALS
WEIGHT: 140.4 LBS | TEMPERATURE: 98.6 F | OXYGEN SATURATION: 98 % | DIASTOLIC BLOOD PRESSURE: 69 MMHG | SYSTOLIC BLOOD PRESSURE: 102 MMHG | HEIGHT: 70 IN | HEART RATE: 73 BPM | BODY MASS INDEX: 20.1 KG/M2 | RESPIRATION RATE: 18 BRPM

## 2021-06-04 DIAGNOSIS — D64.9 ANEMIA, UNSPECIFIED TYPE: ICD-10-CM

## 2021-06-04 DIAGNOSIS — G40.909 SEIZURE DISORDER (HCC): ICD-10-CM

## 2021-06-04 DIAGNOSIS — E78.5 DYSLIPIDEMIA: ICD-10-CM

## 2021-06-04 DIAGNOSIS — F10.10 ALCOHOL ABUSE: ICD-10-CM

## 2021-06-04 DIAGNOSIS — J45.20 MILD INTERMITTENT ASTHMA WITHOUT COMPLICATION: ICD-10-CM

## 2021-06-04 DIAGNOSIS — I10 BENIGN ESSENTIAL HTN: Primary | ICD-10-CM

## 2021-06-04 LAB
CHOLEST SERPL-MCNC: 173 MG/DL
HDLC SERPL-MCNC: 101 MG/DL
HDLC SERPL: 1.7 {RATIO} (ref 0–5)
LDLC SERPL CALC-MCNC: 59.2 MG/DL (ref 0–100)
TRIGL SERPL-MCNC: 64 MG/DL (ref ?–150)
VLDLC SERPL CALC-MCNC: 12.8 MG/DL

## 2021-06-04 PROCEDURE — 99214 OFFICE O/P EST MOD 30 MIN: CPT | Performed by: INTERNAL MEDICINE

## 2021-06-04 NOTE — PROGRESS NOTES
SPORTS MEDICINE AND PRIMARY CARE  Noah Cao MD, [address] [address]  Phone:  [phone]  Fax: [phone]       Chief Complaint   Patient presents with    Hypertension   . SUBJECTIVE:    Ranjit Marcum is a 77 y.o. male The patient returns today with a known history of primary hypertension. The patient states his foot acts up every once in a while, the left foot and he has got a cream to put on it as well as some arthritis pills and is seen for evaluation. Current Outpatient Medications   Medication Sig Dispense Refill    levETIRAcetam (KEPPRA) 500 mg tablet TAKE ONE TABLET BY MOUTH TWICE DAILY 60 Tab 11    rosuvastatin (CRESTOR) 5 mg tablet Take 1 Tab by mouth nightly. 90 Tab 3    thiamine HCL (B-1) 100 mg tablet TAKE ONE TABLET BY MOUTH EVERY DAY 30 Tab 11    traZODone (DESYREL) 100 mg tablet TAKE ONE TABLET BY MOUTH EVERY EVENING 30 Tab 11    metoprolol tartrate (LOPRESSOR) 50 mg tablet TAKE ONE TABLET BY MOUTH TWICE DAILY 60 Tab 11    folic acid (FOLVITE) 1 mg tablet TAKE ONE TABLET BY MOUTH EVERY DAY 30 Tab 11    albuterol (PROVENTIL HFA, VENTOLIN HFA, PROAIR HFA) 90 mcg/actuation inhaler INHALE 2 PUFFS BY MOUTH EVERY 4 HOURS AS NEEDED FOR WHEEZING OR SHORTNESS OF BREATH 8.5 g 11    THERAPY M 9 mg iron-400 mcg tab tablet TAKE ONE TABLET BY MOUTH EVERY  Tab 3    therapeutic multivitamin (THERAGRAN) tablet Take 1 Tab by mouth daily.  27 Tab 3     Past Medical History:   Diagnosis Date    Alcohol abuse     Anemia     Asthma     Colonoscopy refused 02/17/2020    Depression     Hx of cholecystectomy 09/12/2018    homar borrero md rt    Hypertension     Seizure disorder [facility])      Past Surgical History:   Procedure Laterality Date    HX COLONOSCOPY  at age 44     Allergies   Allergen Reactions    Other Medication Hives     Ketchup(cheap brands) causes hives         REVIEW OF SYSTEMS:  General: negative for - chills or fever  ENT: negative for - headaches, nasal congestion or tinnitus  Respiratory: negative for - cough, hemoptysis, shortness of breath or wheezing  Cardiovascular : negative for - chest pain, edema, palpitations or shortness of breath  Gastrointestinal: negative for - abdominal pain, blood in stools, heartburn or nausea/vomiting  Genito-Urinary: no dysuria, trouble voiding, or hematuria  Musculoskeletal: negative for - gait disturbance, joint pain, joint stiffness or joint swelling  Neurological: no TIA or stroke symptoms  Hematologic: no bruises, no bleeding, no swollen glands  Integument: no lumps, mole changes, nail changes or rash  Endocrine: no malaise/lethargy or unexpected weight changes      Social History     Socioeconomic History    Marital status: SINGLE     Spouse name: Not on file    Number of children: 1    Years of education: Not on file    Highest education level: Not on file   Occupational History     Comment: on disbiltiy arthritis and depression    Tobacco Use    Smoking status: Never Smoker    Smokeless tobacco: Never Used   Vaping Use    Vaping Use: Never used   Substance and Sexual Activity    Alcohol use: Yes     Alcohol/week: 2.0 standard drinks     Types: 2 Cans of beer per week     Comment: daily    Drug use: No     Comment: denies     Sexual activity: Not Currently     Partners: Female     Birth control/protection: Condom, None     Comment: sister is CHRISTEN Mercedes [phone]   Social History Narrative    Habits:  He states he only drinks one to two beers a day, however his sister cannot confirm that. She states he has gin plus a 20 ounce beer on a regular basis. The records reflect a long history of alcoholism. He denies cigarette abuse and drug abuse.         Social History:  The patient states with his brother with [employer]ne [employer] checking on both of them and making sure he takes his medications and gets his appointments. He completed the 12th grade. He is disabled due to depression since 2007.   He has a 39year old daughter and three grandchildren. The patient is single. 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 Mother  in her 62s of alcohol abuse. Eight siblings are alive and well. One sister  of alcohol and its complications             Social Determinants of Health     Financial Resource Strain:     Difficulty of Paying Living Expenses:    Food Insecurity:     Worried About Running Out of Food in the Last Year:     [address] in the Last Year:    Transportation Needs:     Lack of Transportation (Medical):  Lack of Transportation (Non-Medical):    Physical Activity:     Days of Exercise per Week:     Minutes of Exercise per Session:    Stress:     Feeling of Stress :    Social Connections:     Frequency of Communication with Friends and Family:     Frequency of Social Gatherings with Friends and Family:     Attends Oriental orthodox Services:     Active Member of Clubs or Organizations:     Attends Club or Organization Meetings:     Marital Status:      Family History   Problem Relation Age of Onset    Alcohol abuse Mother     Alcohol abuse Father     Hypertension Sister        OBJECTIVE:    Visit Vitals  /69   Pulse 73   Temp 98.6 °F (37 °C) (Oral)   Resp 18   Ht 5' 10\" (1.778 m)   Wt 140 lb 6.4 oz (63.7 kg)   SpO2 98%   BMI 20.15 kg/m²     CONSTITUTIONAL: well , well nourished, appears age appropriate  EYES: perrla, eom intact  ENMT:moist mucous membranes, pharynx clear  NECK: supple. Thyroid normal  RESPIRATORY: Chest: clear bilaterally   CARDIOVASCULAR: Heart: regular rate and rhythm  GASTROINTESTINAL: Abdomen: soft, bowel sounds active  HEMATOLOGIC: no pathological lymph nodes palpated  MUSCULOSKELETAL: Extremities: no edema, pulse 1+   INTEGUMENT: No unusual rashes or suspicious skin lesions noted.  Nails appear normal.  NEUROLOGIC: non-focal exam   MENTAL STATUS: alert and oriented, appropriate affect           ASSESSMENT:  1. Benign essential HTN    2. Alcohol abuse    3. Seizure disorder ([facility] [address]    4. Mild intermittent asthma without complication    5. Anemia, unspecified type    6. Dyslipidemia      Blood pressure is a little on the low side. When I repeat the blood pressure, it is 98/60. We will therefore stop the amlodipine and continue the metoprolol twice a day. We will check his blood pressure at next visit and expect it to be in the 120s. There is a history of alcohol abuse, but now he is down to one usually every other day. We congratulated him. No seizures since I last saw him. He declines a colonoscopy. There is a history of asthma, but there is no evidence of bronchospasm on exam today. There is a history of anemia, which has been stable with a hemoglobin of 12 and hematocrit of 34.6. He had a protein electrophoresis three years ago, which was negative. B12 level was normal likewise. He will be back to see me in three months or sooner if he has any problems. We will check his cholesterol today to start him on rosuvastatin, a small dose, and we will see if we have his cholesterol at goal.      I have discussed the diagnosis with the patient and the intended plan as seen in the  Orders. The patient understands and agees with the plan. The patient has   received an after visit summary and questions were answered concerning  future plans  Patient labs and/or xrays were reviewed  Past records were reviewed. PLAN:  . No orders of the defined types were placed in this encounter. Follow-up and Dispositions    · Return in about 3 months (around 9/4/2021). ATTENTION:   This medical record was transcribed using an electronic medical records system. Although proofread, it may and can contain electronic and spelling errors. Other human spelling and other errors may be present.   Corrections may be executed at a later time. Please feel free to contact us for any clarifications as needed.

## 2021-06-04 NOTE — PROGRESS NOTES
1. Have you been to the ER, urgent care clinic since your last visit? Hospitalized since your last visit? No    2. Have you seen or consulted any other health care providers outside of the [address] [place] [address] since your last visit? Include any pap smears or colon screening.  No

## 2021-09-03 ENCOUNTER — OFFICE VISIT (OUTPATIENT)
Dept: INTERNAL MEDICINE CLINIC | Age: 66
End: 2021-09-03
Payer: MEDICAID

## 2021-09-03 VITALS
HEIGHT: 70 IN | BODY MASS INDEX: 20.15 KG/M2 | HEART RATE: 69 BPM | TEMPERATURE: 97.5 F | DIASTOLIC BLOOD PRESSURE: 65 MMHG | OXYGEN SATURATION: 92 % | RESPIRATION RATE: 18 BRPM | SYSTOLIC BLOOD PRESSURE: 104 MMHG

## 2021-09-03 DIAGNOSIS — E78.5 DYSLIPIDEMIA: ICD-10-CM

## 2021-09-03 DIAGNOSIS — I10 BENIGN ESSENTIAL HTN: ICD-10-CM

## 2021-09-03 DIAGNOSIS — J45.909 MILD ASTHMA WITHOUT COMPLICATION, UNSPECIFIED WHETHER PERSISTENT: ICD-10-CM

## 2021-09-03 DIAGNOSIS — G40.909 SEIZURE DISORDER (HCC): ICD-10-CM

## 2021-09-03 DIAGNOSIS — R25.1 TREMORS OF NERVOUS SYSTEM: ICD-10-CM

## 2021-09-03 DIAGNOSIS — F32.A MILD DEPRESSION: ICD-10-CM

## 2021-09-03 DIAGNOSIS — F10.10 ALCOHOL ABUSE: Primary | ICD-10-CM

## 2021-09-03 PROCEDURE — 99215 OFFICE O/P EST HI 40 MIN: CPT | Performed by: INTERNAL MEDICINE

## 2021-09-03 RX ORDER — PREDNISONE 20 MG/1
TABLET ORAL
COMMUNITY
Start: 2021-08-29 | End: 2021-09-03 | Stop reason: ALTCHOICE

## 2021-09-03 RX ORDER — NAPROXEN 500 MG/1
TABLET ORAL
COMMUNITY
Start: 2021-08-29

## 2021-09-03 NOTE — PROGRESS NOTES
SPORTS MEDICINE AND PRIMARY CARE  Kannan Rivera MD, [address] [address]  Phone:  [phone]  Fax: [phone]       Chief Complaint   Patient presents with   Swati Callahan ED Follow-up   . SUBJECTIVE:    Brendan Florez is a 74 y.o. male Patient returns today with a known history of alcohol abuse, dyslipidemia, seizure disorder, depression, primary hypertension, tremors, asthma, and is seen for evaluation. Since we last saw him, he was at the ER with a gout attack and apparently had fluid removed from his knee, given Naprosyn to take twice a day, as well as prednisone to take for three days and comes in today saying he is still having problem with his knee. In fact, he nearly collapsed in the office and is now sitting in a wheelchair. Fortunately he did not actually collapse. Patient is seen for evaluation. His sister notes there is an issue with her son and his nephew with bringing him alcohol. She will not give them any money for alcohol, so they actually go out and buy him the alcohol and give it to him. Patient is seen for evaluation. Current Outpatient Medications   Medication Sig Dispense Refill    naproxen (NAPROSYN) 500 mg tablet TAKE 1 TABLET BY MOUTH TWICE A DAY FOR GOUT PAIN      levETIRAcetam (KEPPRA) 500 mg tablet TAKE ONE TABLET BY MOUTH TWICE DAILY 60 Tab 11    rosuvastatin (CRESTOR) 5 mg tablet Take 1 Tab by mouth nightly.  90 Tab 3    thiamine HCL (B-1) 100 mg tablet TAKE ONE TABLET BY MOUTH EVERY DAY 30 Tab 11    traZODone (DESYREL) 100 mg tablet TAKE ONE TABLET BY MOUTH EVERY EVENING 30 Tab 11    folic acid (FOLVITE) 1 mg tablet TAKE ONE TABLET BY MOUTH EVERY DAY 30 Tab 11    albuterol (PROVENTIL HFA, VENTOLIN HFA, PROAIR HFA) 90 mcg/actuation inhaler INHALE 2 PUFFS BY MOUTH EVERY 4 HOURS AS NEEDED FOR WHEEZING OR SHORTNESS OF BREATH 8.5 g 11    THERAPY M 9 mg iron-400 mcg tab tablet TAKE ONE TABLET BY MOUTH EVERY  Tab 3    therapeutic multivitamin (THERAGRAN) tablet Take 1 Tab by mouth daily. 27 Tab 3     Past Medical History:   Diagnosis Date    Alcohol abuse     Anemia     Asthma     Colonoscopy refused 02/17/2020    Depression     Hx of cholecystectomy 09/12/2018    homar borrero md rt    Hypertension     Seizure disorder [facility])      Past Surgical History:   Procedure Laterality Date    HX COLONOSCOPY  at age 44     Allergies   Allergen Reactions    Other Medication Hives     Ketchup(cheap brands) causes hives         REVIEW OF SYSTEMS:  General: negative for - chills or fever  ENT: negative for - headaches, nasal congestion or tinnitus  Respiratory: negative for - cough, hemoptysis, shortness of breath or wheezing  Cardiovascular : negative for - chest pain, edema, palpitations or shortness of breath  Gastrointestinal: negative for - abdominal pain, blood in stools, heartburn or nausea/vomiting  Genito-Urinary: no dysuria, trouble voiding, or hematuria  Musculoskeletal: negative for - gait disturbance, joint pain, joint stiffness or joint swelling  Neurological: no TIA or stroke symptoms  Hematologic: no bruises, no bleeding, no swollen glands  Integument: no lumps, mole changes, nail changes or rash  Endocrine: no malaise/lethargy or unexpected weight changes      Social History     Socioeconomic History    Marital status: SINGLE     Spouse name: Not on file    Number of children: 1    Years of education: Not on file    Highest education level: Not on file   Occupational History     Comment: on disbiltiy arthritis and depression    Tobacco Use    Smoking status: Never Smoker    Smokeless tobacco: Never Used   Vaping Use    Vaping Use: Never used   Substance and Sexual Activity    Alcohol use:  Yes     Alcohol/week: 2.0 standard drinks     Types: 2 Cans of beer per week     Comment: daily    Drug use: No     Comment: denies     Sexual activity: Not Currently     Partners: Female     Birth control/protection: Condom, None     Comment: sister is CHRISTEN Holden [phone]   Social History Narrative    Habits:  He states he only drinks one to two beers a day, however his sister cannot confirm that. She states he has gin plus a 20 ounce beer on a regular basis. The records reflect a long history of alcoholism. He denies cigarette abuse and drug abuse.         Social History:  The patient states with his brother with Oscar Beckwith checking on both of them and making sure he takes his medications and gets his appointments. He completed the 12th grade. He is disabled due to depression since . He has a 39year old daughter and three grandchildren. The patient is single. 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 Mother  in her 62s of alcohol abuse. Eight siblings are alive and well. One sister  of alcohol and its complications             Social Determinants of Health     Financial Resource Strain:     Difficulty of Paying Living Expenses:    Food Insecurity:     Worried About Running Out of Food in the Last Year:     [address] in the Last Year:    Transportation Needs:     Lack of Transportation (Medical):      Lack of Transportation (Non-Medical):    Physical Activity:     Days of Exercise per Week:     Minutes of Exercise per Session:    Stress:     Feeling of Stress :    Social Connections:     Frequency of Communication with Friends and Family:     Frequency of Social Gatherings with Friends and Family:     Attends Synagogue Services:     Active Member of Clubs or Organizations:     Attends Club or Organization Meetings:     Marital Status:      Family History   Problem Relation Age of Onset    Alcohol abuse Mother     Alcohol abuse Father     Hypertension Sister        OBJECTIVE:    Visit Vitals  /65   Pulse 69   Temp 97.5 °F (36.4 °C) (Oral)   Resp 18   Ht 5' 10\" (1.778 m)   SpO2 92%   BMI 20.15 kg/m²     CONSTITUTIONAL: well , well nourished, appears age appropriate  EYES: perrla, eom intact  ENMT:moist mucous membranes, pharynx clear  NECK: supple. Thyroid normal  RESPIRATORY: Chest: clear bilaterally   CARDIOVASCULAR: Heart: regular rate and rhythm  GASTROINTESTINAL: Abdomen: soft, bowel sounds active  HEMATOLOGIC: no pathological lymph nodes palpated  MUSCULOSKELETAL: Extremities: no edema, pulse 1+   INTEGUMENT: No unusual rashes or suspicious skin lesions noted. Nails appear normal.  NEUROLOGIC: non-focal exam   MENTAL STATUS: alert and oriented, appropriate affect           ASSESSMENT:  1. Alcohol abuse    2. Mild depression ([address]    3. Dyslipidemia    4. Seizure disorder ([address]    5. Benign essential HTN    6. Tremors of nervous system    7. Mild asthma without complication, unspecified whether persistent      Alcohol abuse will not stop. One way or the other he is determined to get alcohol. We have said in the past he should stop drinking. We give his sister a strong statement to say to anybody that brings him alcohol into the house. He has a component of depression, for which he is taking Trazodone. He has dyslipidemia and is on Rosuvastatin. There is a history of seizure disorder, but to my knowledge he has not seen any seizures since I saw him last.    His blood pressure is entirely too low and that is the reason he collapsed. When he first came in, it was 104/65. When I check it now, it drops to 83/60 when he stands. We stop the Lopressor completely and ask his sister to give him plenty of fluids today and advise him that he should be bed to chair confined today and should not try to walk around the house because his pressure is so low that he will collapse again. We will also take him out to the car in a wheelchair, she will drive up to the door so he will not have to walk too far.     There is no evidence of bronchospasm on exam.  He will be back to see me in a week, primarily so we can look at his blood pressure. I have discussed the diagnosis with the patient and the intended plan as seen in the  Orders. The patient understands and agees with the plan. The patient has   received an after visit summary and questions were answered concerning  future plans  Patient labs and/or xrays were reviewed  Past records were reviewed. PLAN:  .  Orders Placed This Encounter    RENAL FUNCTION PANEL    CBC WITH AUTOMATED DIFF    AMMONIA    DISCONTD: predniSONE (DELTASONE) 20 mg tablet    naproxen (NAPROSYN) 500 mg tablet       Follow-up and Dispositions    · Return in about 1 week (around 9/10/2021). ATTENTION:   This medical record was transcribed using an electronic medical records system. Although proofread, it may and can contain electronic and spelling errors. Other human spelling and other errors may be present. Corrections may be executed at a later time. Please feel free to contact us for any clarifications as needed.

## 2021-09-03 NOTE — PROGRESS NOTES
1. Have you been to the ER, urgent care clinic since your last visit? Hospitalized since your last visit? Yes When: 8-29-21 Reason for visit: gout    2. Have you seen or consulted any other health care providers outside of the [address] [place] [address] since your last visit? Include any pap smears or colon screening.  Yes Where: [facility]     ED follow up  falls

## 2021-09-05 LAB
ALBUMIN SERPL-MCNC: 2.6 G/DL (ref 3.5–5)
ANION GAP SERPL CALC-SCNC: 14 MMOL/L (ref 5–15)
BASOPHILS # BLD: 0 K/UL (ref 0–0.1)
BASOPHILS NFR BLD: 0 % (ref 0–1)
BUN SERPL-MCNC: 55 MG/DL (ref 6–20)
BUN/CREAT SERPL: 33 (ref 12–20)
CALCIUM SERPL-MCNC: 9.4 MG/DL (ref 8.5–10.1)
CHLORIDE SERPL-SCNC: 97 MMOL/L (ref 97–108)
CO2 SERPL-SCNC: 23 MMOL/L (ref 21–32)
CREAT SERPL-MCNC: 1.69 MG/DL (ref 0.7–1.3)
DIFFERENTIAL METHOD BLD: ABNORMAL
EOSINOPHIL # BLD: 0 K/UL (ref 0–0.4)
EOSINOPHIL NFR BLD: 0 % (ref 0–7)
ERYTHROCYTE [DISTWIDTH] IN BLOOD BY AUTOMATED COUNT: 16.2 % (ref 11.5–14.5)
GLUCOSE SERPL-MCNC: 110 MG/DL (ref 65–100)
HCT VFR BLD AUTO: 29.9 % (ref 36.6–50.3)
HGB BLD-MCNC: 9.4 G/DL (ref 12.1–17)
IMM GRANULOCYTES # BLD AUTO: 0.1 K/UL (ref 0–0.04)
IMM GRANULOCYTES NFR BLD AUTO: 1 % (ref 0–0.5)
LYMPHOCYTES # BLD: 2.8 K/UL (ref 0.8–3.5)
LYMPHOCYTES NFR BLD: 17 % (ref 12–49)
MCH RBC QN AUTO: 29.4 PG (ref 26–34)
MCHC RBC AUTO-ENTMCNC: 31.4 G/DL (ref 30–36.5)
MCV RBC AUTO: 93.4 FL (ref 80–99)
MONOCYTES # BLD: 1.8 K/UL (ref 0–1)
MONOCYTES NFR BLD: 11 % (ref 5–13)
NEUTS SEG # BLD: 11.2 K/UL (ref 1.8–8)
NEUTS SEG NFR BLD: 71 % (ref 32–75)
NRBC # BLD: 0 K/UL (ref 0–0.01)
NRBC BLD-RTO: 0 PER 100 WBC
PHOSPHATE SERPL-MCNC: 2 MG/DL (ref 2.6–4.7)
PLATELET # BLD AUTO: 586 K/UL (ref 150–400)
PMV BLD AUTO: 10.7 FL (ref 8.9–12.9)
POTASSIUM SERPL-SCNC: 3.5 MMOL/L (ref 3.5–5.1)
RBC # BLD AUTO: 3.2 M/UL (ref 4.1–5.7)
SODIUM SERPL-SCNC: 134 MMOL/L (ref 136–145)
WBC # BLD AUTO: 15.9 K/UL (ref 4.1–11.1)

## 2021-09-08 LAB — AMMONIA PLAS-SCNC: 59 UMOL/L

## 2021-09-08 RX ORDER — LACTULOSE 10 G/15ML
10 SOLUTION ORAL; RECTAL 2 TIMES DAILY
Qty: 946 ML | Refills: 11 | Status: SHIPPED | OUTPATIENT
Start: 2021-09-08

## 2021-09-10 ENCOUNTER — OFFICE VISIT (OUTPATIENT)
Dept: INTERNAL MEDICINE CLINIC | Age: 66
End: 2021-09-10
Payer: MEDICAID

## 2021-09-10 VITALS
SYSTOLIC BLOOD PRESSURE: 123 MMHG | TEMPERATURE: 97.4 F | HEIGHT: 70 IN | WEIGHT: 138.6 LBS | BODY MASS INDEX: 19.84 KG/M2 | OXYGEN SATURATION: 95 % | RESPIRATION RATE: 18 BRPM | DIASTOLIC BLOOD PRESSURE: 80 MMHG | HEART RATE: 71 BPM

## 2021-09-10 DIAGNOSIS — D64.9 ANEMIA, UNSPECIFIED TYPE: ICD-10-CM

## 2021-09-10 DIAGNOSIS — R25.1 TREMORS OF NERVOUS SYSTEM: ICD-10-CM

## 2021-09-10 DIAGNOSIS — F10.10 ETOH ABUSE: ICD-10-CM

## 2021-09-10 DIAGNOSIS — I10 BENIGN ESSENTIAL HTN: Primary | ICD-10-CM

## 2021-09-10 DIAGNOSIS — R56.9 CONVULSIONS, UNSPECIFIED CONVULSION TYPE (HCC): ICD-10-CM

## 2021-09-10 PROCEDURE — 99213 OFFICE O/P EST LOW 20 MIN: CPT | Performed by: INTERNAL MEDICINE

## 2021-09-10 NOTE — PROGRESS NOTES
1. Have you been to the ER, urgent care clinic since your last visit? Hospitalized since your last visit? No    2. Have you seen or consulted any other health care providers outside of the [address] [place] [address] since your last visit? Include any pap smears or colon screening.  No    Follow up

## 2021-09-10 NOTE — PROGRESS NOTES
SPORTS MEDICINE AND PRIMARY CARE  Deepali Devine MD, [address] [address]  Phone:  [phone]  Fax: [phone]      Chief Complaint   Patient presents with    Hypertension         SUBECTIVE:    Tra Ramires is a 74 y.o. male Patient returns today at our request.  He had an episode of hypotension when we saw him last.  There is a history of hypertension, alcohol abuse, tremors, anemia, seizure disorder and is seen for evaluation. Patient returns today saying he is feeling better than he was. He has lightened up on the alcohol. He is seen for evaluation. Current Outpatient Medications   Medication Sig Dispense Refill    lactulose (CHRONULAC) 10 gram/15 mL solution Take 15 mL by mouth two (2) times a day. 946 mL 11    naproxen (NAPROSYN) 500 mg tablet TAKE 1 TABLET BY MOUTH TWICE A DAY FOR GOUT PAIN      levETIRAcetam (KEPPRA) 500 mg tablet TAKE ONE TABLET BY MOUTH TWICE DAILY 60 Tab 11    rosuvastatin (CRESTOR) 5 mg tablet Take 1 Tab by mouth nightly. 90 Tab 3    thiamine HCL (B-1) 100 mg tablet TAKE ONE TABLET BY MOUTH EVERY DAY 30 Tab 11    traZODone (DESYREL) 100 mg tablet TAKE ONE TABLET BY MOUTH EVERY EVENING 30 Tab 11    folic acid (FOLVITE) 1 mg tablet TAKE ONE TABLET BY MOUTH EVERY DAY 30 Tab 11    albuterol (PROVENTIL HFA, VENTOLIN HFA, PROAIR HFA) 90 mcg/actuation inhaler INHALE 2 PUFFS BY MOUTH EVERY 4 HOURS AS NEEDED FOR WHEEZING OR SHORTNESS OF BREATH 8.5 g 11    THERAPY M 9 mg iron-400 mcg tab tablet TAKE ONE TABLET BY MOUTH EVERY  Tab 3    therapeutic multivitamin (THERAGRAN) tablet Take 1 Tab by mouth daily.  27 Tab 3     Past Medical History:   Diagnosis Date    Alcohol abuse     Anemia     Asthma     Colonoscopy refused 02/17/2020    Depression     Hx of cholecystectomy 09/12/2018    homar borrero md rt    Hypertension     Seizure disorder [facility])      Past Surgical History:   Procedure Laterality Date    HX COLONOSCOPY  at age 44 Allergies   Allergen Reactions    Other Medication Hives     Ketchup(cheap brands) causes hives       REVIEW OF SYSTEMS:   No syncope, no pain. Social History     Socioeconomic History    Marital status: SINGLE     Spouse name: Not on file    Number of children: 1    Years of education: Not on file    Highest education level: Not on file   Occupational History     Comment: on disbiltiy arthritis and depression    Tobacco Use    Smoking status: Never Smoker    Smokeless tobacco: Never Used   Vaping Use    Vaping Use: Never used   Substance and Sexual Activity    Alcohol use: Yes     Alcohol/week: 2.0 standard drinks     Types: 2 Cans of beer per week     Comment: daily    Drug use: No     Comment: denies     Sexual activity: Not Currently     Partners: Female     Birth control/protection: Condom, None     Comment: sister is CHRISTEN Springer [phone]   Social History Narrative    Habits:  He states he only drinks one to two beers a day, however his sister cannot confirm that. She states he has gin plus a 20 ounce beer on a regular basis. The records reflect a long history of alcoholism. He denies cigarette abuse and drug abuse.         Social History:  The patient states with his brother with [employer] [facility] checking on both of them and making sure he takes his medications and gets his appointments. He completed the 12th grade. He is disabled due to depression since . He has a 39year old daughter and three grandchildren. The patient is single. 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 Mother  in her 62s of alcohol abuse. Eight siblings are alive and well.   One sister  of alcohol and its complications             Social Determinants of Health     Financial Resource Strain:     Difficulty of Paying Living Expenses:    Food Insecurity:     Worried About Running Out of Food in the Last Year:    [address] in the Last Year:    Transportation Needs:     Lack of Transportation (Medical):  Lack of Transportation (Non-Medical):    Physical Activity:     Days of Exercise per Week:     Minutes of Exercise per Session:    Stress:     Feeling of Stress :    Social Connections:     Frequency of Communication with Friends and Family:     Frequency of Social Gatherings with Friends and Family:     Attends Confucianist Services:     Active Member of Clubs or Organizations:     Attends Club or Organization Meetings:     Marital Status:    r  Family History   Problem Relation Age of Onset    Alcohol abuse Mother     Alcohol abuse Father     Hypertension Sister        OBJECTIVE:  Visit Vitals  /80   Pulse 71   Temp 97.4 °F (36.3 °C) (Oral)   Resp 18   Ht 5' 10\" (1.778 m)   Wt 138 lb 9.6 oz (62.9 kg)   SpO2 95%   BMI 19.89 kg/m²     ENT: perrla,  eom intact  NECK: supple. Thyroid normal  CHEST: clear to ascultation and percussion   HEART: regular rate and rhythm  ABD: soft, bowel sounds active  EXTREMITIES: no edema, pulse 1+     Orders Only on 09/07/2021   Component Date Value Ref Range Status    Ammonia 09/07/2021 59* <32 UMOL/L Final    Comment: Ammonia determinations are subject to marked lability. Upon standing, ammonia  levels increase rapidly due to red cell metabolism. Concentrations may more  than double in plasma if sample is stored at room temperature for 6 hours.      Office Visit on 09/03/2021   Component Date Value Ref Range Status    WBC 09/03/2021 15.9* 4.1 - 11.1 K/uL Final    RBC 09/03/2021 3.20* 4.10 - 5.70 M/uL Final    HGB 09/03/2021 9.4* 12.1 - 17.0 g/dL Final    HCT 09/03/2021 29.9* 36.6 - 50.3 % Final    MCV 09/03/2021 93.4  80.0 - 99.0 FL Final    MCH 09/03/2021 29.4  26.0 - 34.0 PG Final    MCHC 09/03/2021 31.4  30.0 - 36.5 g/dL Final    RDW 09/03/2021 16.2* 11.5 - 14.5 % Final    PLATELET 73/51/6564 780* 150 - 400 K/uL Final    MPV 09/03/2021 10.7  8.9 - 12.9 FL Final    NRBC 09/03/2021 0.0  0  WBC Final    ABSOLUTE NRBC 09/03/2021 0.00  0.00 - 0.01 K/uL Final    NEUTROPHILS 09/03/2021 71  32 - 75 % Final    LYMPHOCYTES 09/03/2021 17  12 - 49 % Final    MONOCYTES 09/03/2021 11  5 - 13 % Final    EOSINOPHILS 09/03/2021 0  0 - 7 % Final    BASOPHILS 09/03/2021 0  0 - 1 % Final    IMMATURE GRANULOCYTES 09/03/2021 1* 0.0 - 0.5 % Final    ABS. NEUTROPHILS 09/03/2021 11.2* 1.8 - 8.0 K/UL Final    ABS. LYMPHOCYTES 09/03/2021 2.8  0.8 - 3.5 K/UL Final    ABS. MONOCYTES 09/03/2021 1.8* 0.0 - 1.0 K/UL Final    ABS. EOSINOPHILS 09/03/2021 0.0  0.0 - 0.4 K/UL Final    ABS. BASOPHILS 09/03/2021 0.0  0.0 - 0.1 K/UL Final    ABS. IMM. GRANS. 09/03/2021 0.1* 0.00 - 0.04 K/UL Final    DF 09/03/2021 AUTOMATED    Final    Sodium 09/03/2021 134* 136 - 145 mmol/L Final    Potassium 09/03/2021 3.5  3.5 - 5.1 mmol/L Final    Chloride 09/03/2021 97  97 - 108 mmol/L Final    CO2 09/03/2021 23  21 - 32 mmol/L Final    Anion gap 09/03/2021 14  5 - 15 mmol/L Final    Glucose 09/03/2021 110* 65 - 100 mg/dL Final    BUN 09/03/2021 55* 6 - 20 MG/DL Final    Creatinine 09/03/2021 1.69* 0.70 - 1.30 MG/DL Final    BUN/Creatinine ratio 09/03/2021 33* 12 - 20   Final    GFR est AA 09/03/2021 49* >60 ml/min/1.73m2 Final    GFR est non-AA 09/03/2021 41* >60 ml/min/1.73m2 Final    Comment: Estimated GFR is calculated using the IDMS-traceable Modification of Diet in  Renal Disease (MDRD) Study equation, reported for both  Americans  (GFRAA) and non- Americans (GFRNA), and normalized to 1.73m2 body  surface area. The physician must decide which value applies to the patient.  Calcium 09/03/2021 9.4  8.5 - 10.1 MG/DL Final    Phosphorus 09/03/2021 2.0* 2.6 - 4.7 MG/DL Final    Albumin 09/03/2021 2.6* 3.5 - 5.0 g/dL Final          ASSESSMENT:  1. Benign essential HTN    2. ETOH abuse    3.  Tremors of nervous system 4. Anemia, unspecified type    5. Convulsions, unspecified convulsion type ([address]      He is on no medicines for blood pressure and his blood pressure is appropriate now, as opposed to hypotension the last visit. He continues to abuse alcohol and we again encourage him to discontinue the habit. He had a leukocytosis on his last visit and we will check a CBC today. He has had no seizures since we last saw him. He also had what appears to be acute renal failure, or developing acute renal failure, so we will check a renal panel today. He will be back to see me in two to three months, sooner if needed. I have discussed the diagnosis with the patient and the intended plan as seen in the  orders above. The patient understands and agees with the plan. The patient has   received an after visit summary and questions were answered concerning  future plans  Patient labs and/or xrays were reviewed  Past records were reviewed. PLAN:  .  Orders Placed This Encounter    CBC WITH AUTOMATED DIFF    RENAL FUNCTION PANEL       Follow-up and Dispositions    · Return in about 3 months (around 12/10/2021). ATTENTION:   This medical record was transcribed using an electronic medical records system. Although proofread, it may and can contain electronic and spelling errors. Other human spelling and other errors may be present. Corrections may be executed at a later time. Please feel free to contact us for any clarifications as needed.

## 2021-09-11 LAB
ALBUMIN SERPL-MCNC: 2.4 G/DL (ref 3.5–5)
ANION GAP SERPL CALC-SCNC: 8 MMOL/L (ref 5–15)
BASOPHILS # BLD: 0.1 K/UL (ref 0–0.1)
BASOPHILS NFR BLD: 1 % (ref 0–1)
BUN SERPL-MCNC: 18 MG/DL (ref 6–20)
BUN/CREAT SERPL: 16 (ref 12–20)
CALCIUM SERPL-MCNC: 9 MG/DL (ref 8.5–10.1)
CHLORIDE SERPL-SCNC: 98 MMOL/L (ref 97–108)
CO2 SERPL-SCNC: 28 MMOL/L (ref 21–32)
CREAT SERPL-MCNC: 1.14 MG/DL (ref 0.7–1.3)
DIFFERENTIAL METHOD BLD: ABNORMAL
EOSINOPHIL # BLD: 0.1 K/UL (ref 0–0.4)
EOSINOPHIL NFR BLD: 1 % (ref 0–7)
ERYTHROCYTE [DISTWIDTH] IN BLOOD BY AUTOMATED COUNT: 16.5 % (ref 11.5–14.5)
GLUCOSE SERPL-MCNC: 90 MG/DL (ref 65–100)
HCT VFR BLD AUTO: 29.5 % (ref 36.6–50.3)
HGB BLD-MCNC: 9.1 G/DL (ref 12.1–17)
IMM GRANULOCYTES # BLD AUTO: 0.1 K/UL (ref 0–0.04)
IMM GRANULOCYTES NFR BLD AUTO: 1 % (ref 0–0.5)
LYMPHOCYTES # BLD: 2.7 K/UL (ref 0.8–3.5)
LYMPHOCYTES NFR BLD: 28 % (ref 12–49)
MCH RBC QN AUTO: 28.8 PG (ref 26–34)
MCHC RBC AUTO-ENTMCNC: 30.8 G/DL (ref 30–36.5)
MCV RBC AUTO: 93.4 FL (ref 80–99)
MONOCYTES # BLD: 1.7 K/UL (ref 0–1)
MONOCYTES NFR BLD: 17 % (ref 5–13)
NEUTS SEG # BLD: 5.3 K/UL (ref 1.8–8)
NEUTS SEG NFR BLD: 52 % (ref 32–75)
NRBC # BLD: 0 K/UL (ref 0–0.01)
NRBC BLD-RTO: 0 PER 100 WBC
PHOSPHATE SERPL-MCNC: 3.1 MG/DL (ref 2.6–4.7)
PLATELET # BLD AUTO: 454 K/UL (ref 150–400)
PMV BLD AUTO: 10.4 FL (ref 8.9–12.9)
POTASSIUM SERPL-SCNC: 4.1 MMOL/L (ref 3.5–5.1)
RBC # BLD AUTO: 3.16 M/UL (ref 4.1–5.7)
SODIUM SERPL-SCNC: 134 MMOL/L (ref 136–145)
WBC # BLD AUTO: 10 K/UL (ref 4.1–11.1)

## 2021-10-26 ENCOUNTER — TELEPHONE (OUTPATIENT)
Dept: INTERNAL MEDICINE CLINIC | Age: 66
End: 2021-10-26

## 2021-10-26 NOTE — TELEPHONE ENCOUNTER
Per Dr. Stella Samaniego patient treated with prednisone 20mg 2 tabs daily x 5 days #10 for gout in knee

## 2021-11-08 ENCOUNTER — HOSPITAL ENCOUNTER (EMERGENCY)
Age: 66
Discharge: HOME | End: 2021-11-08
Attending: EMERGENCY MEDICINE
Payer: MEDICARE

## 2021-11-08 VITALS
WEIGHT: 145 LBS | HEIGHT: 72 IN | DIASTOLIC BLOOD PRESSURE: 57 MMHG | OXYGEN SATURATION: 100 % | HEART RATE: 77 BPM | RESPIRATION RATE: 18 BRPM | SYSTOLIC BLOOD PRESSURE: 96 MMHG | TEMPERATURE: 98.3 F | BODY MASS INDEX: 19.64 KG/M2

## 2021-11-08 DIAGNOSIS — M10.9 ACUTE GOUT OF LEFT KNEE, UNSPECIFIED CAUSE: Primary | ICD-10-CM

## 2021-11-08 PROCEDURE — 99283 EMERGENCY DEPT VISIT LOW MDM: CPT

## 2021-11-08 PROCEDURE — 74011250637 HC RX REV CODE- 250/637: Performed by: NURSE PRACTITIONER

## 2021-11-08 RX ORDER — IBUPROFEN 400 MG/1
800 TABLET ORAL
Status: COMPLETED | OUTPATIENT
Start: 2021-11-08 | End: 2021-11-08

## 2021-11-08 RX ORDER — DICLOFENAC SODIUM 10 MG/G
2 GEL TOPICAL 4 TIMES DAILY
Qty: 1 EACH | Refills: 0 | Status: SHIPPED | OUTPATIENT
Start: 2021-11-08

## 2021-11-08 RX ORDER — DEXTROMETHORPHAN HYDROBROMIDE, GUAIFENESIN 5; 100 MG/5ML; MG/5ML
650 LIQUID ORAL EVERY 8 HOURS
Qty: 20 TABLET | Refills: 0 | Status: SHIPPED | OUTPATIENT
Start: 2021-11-08

## 2021-11-08 RX ADMIN — IBUPROFEN 800 MG: 400 TABLET ORAL at 16:18

## 2021-11-08 NOTE — ED NOTES
Pt presents to ED ambulatory complaining of left knee pain x 1 week. Pt denies injury or trauma to area. Pt reports a hx of arthritis. Pt is alert and oriented x 4, RR even and unlabored, skin is warm and dry. Assessment completed and pt updated on plan of care. Call bell in reach. Emergency Department Nursing Plan of Care       The Nursing Plan of Care is developed from the Nursing assessment and Emergency Department Attending provider initial evaluation. The plan of care may be reviewed in the ED Provider note.     The Plan of Care was developed with the following considerations:   Patient / Family readiness to learn indicated by:verbalized understanding  Persons(s) to be included in education: patient  Barriers to Learning/Limitations:No    Signed     Alicia Perez RN    11/8/2021   3:36 PM

## 2021-11-08 NOTE — ED PROVIDER NOTES
EMERGENCY DEPARTMENT HISTORY AND PHYSICAL EXAM    Date: 11/8/2021  Patient Name: Saúl Jones    History of Presenting Illness     Chief Complaint   Patient presents with    Knee Pain         History Provided By: Patient    Chief Complaint: knee pain  Duration: one week   Timing:  Acute  Location: left knee  Quality: Aching  Severity: 10 out of 10  Modifying Factors: worse when walking  Associated Symptoms: swelling      HPI: Saúl Jones is a 74 y.o. male with a PMH of Alcohol abuse asthma hypertension and as below who presents with left knee pain for one week. States he is having a gout flare. Denies injury. Patient requests Voltaren gel, states he has used this in the past and it relieves his pain. PCP: Diane Morales MD    Current Outpatient Medications   Medication Sig Dispense Refill    diclofenac (Voltaren Arthritis Pain) 1 % gel Apply 2 g to affected area four (4) times daily. 1 Each 0    acetaminophen (Tylenol Arthritis Pain) 650 mg TbER Take 1 Tablet by mouth every eight (8) hours. 20 Tablet 0    lactulose (CHRONULAC) 10 gram/15 mL solution Take 15 mL by mouth two (2) times a day. 946 mL 11    naproxen (NAPROSYN) 500 mg tablet TAKE 1 TABLET BY MOUTH TWICE A DAY FOR GOUT PAIN      levETIRAcetam (KEPPRA) 500 mg tablet TAKE ONE TABLET BY MOUTH TWICE DAILY 60 Tab 11    rosuvastatin (CRESTOR) 5 mg tablet Take 1 Tab by mouth nightly.  90 Tab 3    thiamine HCL (B-1) 100 mg tablet TAKE ONE TABLET BY MOUTH EVERY DAY 30 Tab 11    traZODone (DESYREL) 100 mg tablet TAKE ONE TABLET BY MOUTH EVERY EVENING 30 Tab 11    folic acid (FOLVITE) 1 mg tablet TAKE ONE TABLET BY MOUTH EVERY DAY 30 Tab 11    albuterol (PROVENTIL HFA, VENTOLIN HFA, PROAIR HFA) 90 mcg/actuation inhaler INHALE 2 PUFFS BY MOUTH EVERY 4 HOURS AS NEEDED FOR WHEEZING OR SHORTNESS OF BREATH 8.5 g 11    THERAPY M 9 mg iron-400 mcg tab tablet TAKE ONE TABLET BY MOUTH EVERY  Tab 3    therapeutic multivitamin (THERAGRAN) tablet Take 1 Tab by mouth daily. 27 Tab 3       Past History     Past Medical History:  Past Medical History:   Diagnosis Date    Alcohol abuse     Anemia     Asthma     Colonoscopy refused 02/17/2020    Depression     Hx of cholecystectomy 09/12/2018    homar borrero md rt    Hypertension     Seizure disorder [facility])        Past Surgical History:  Past Surgical History:   Procedure Laterality Date    HX COLONOSCOPY  at age 44       Family History:  Family History   Problem Relation Age of Onset    Alcohol abuse Mother     Alcohol abuse Father     Hypertension Sister        Social History:  Social History     Tobacco Use    Smoking status: Never Smoker    Smokeless tobacco: Never Used   Vaping Use    Vaping Use: Never used   Substance Use Topics    Alcohol use: Yes     Alcohol/week: 2.0 standard drinks     Types: 2 Cans of beer per week     Comment: daily    Drug use: No     Comment: denies        Allergies: Allergies   Allergen Reactions    Other Medication Hives     Ketchup(cheap brands) causes hives         Review of Systems   Review of Systems   Constitutional: Negative for chills, fatigue and fever. HENT: Negative for congestion and sore throat. Eyes: Negative for redness. Respiratory: Negative for cough, chest tightness and wheezing. Cardiovascular: Negative for chest pain. Gastrointestinal: Negative for abdominal pain. Genitourinary: Negative for dysuria. Musculoskeletal: Positive for arthralgias. Negative for back pain, myalgias and neck stiffness. Knee pain   Skin: Negative for rash. Neurological: Negative for dizziness, syncope, weakness, light-headedness, numbness and headaches. Hematological: Negative for adenopathy. Psychiatric/Behavioral: Negative for agitation and behavioral problems. All other systems reviewed and are negative.       Physical Exam     Vitals:    11/08/21 1517 11/08/21 1520   BP:  (!) 96/57   Pulse: 77    Resp: 18    Temp: 98.3 °F (36.8 °C)    SpO2: 100%    Weight: 65.8 kg (145 lb)    Height: 6' (1.829 m)      Physical Exam  Vitals and nursing note reviewed. Constitutional:       Appearance: He is well-developed. HENT:      Head: Normocephalic and atraumatic. Right Ear: External ear normal.      Left Ear: External ear normal.      Nose: Nose normal.      Mouth/Throat:      Mouth: Mucous membranes are moist.   Eyes:      General:         Right eye: No discharge. Left eye: No discharge. Conjunctiva/sclera: Conjunctivae normal.   Cardiovascular:      Rate and Rhythm: Normal rate and regular rhythm. Heart sounds: Normal heart sounds. Pulmonary:      Effort: Pulmonary effort is normal. No respiratory distress. Breath sounds: Normal breath sounds. No wheezing. Abdominal:      General: Bowel sounds are normal.      Palpations: Abdomen is soft. Tenderness: There is no abdominal tenderness. Musculoskeletal:         General: Swelling present. Normal range of motion. Cervical back: Normal range of motion and neck supple. Comments: Swelling left knee +TTP no redness or warmth   Lymphadenopathy:      Cervical: No cervical adenopathy. Skin:     General: Skin is warm and dry. Neurological:      Mental Status: He is alert and oriented to person, place, and time. Cranial Nerves: No cranial nerve deficit. Psychiatric:         Behavior: Behavior normal.         Thought Content: Thought content normal.         Judgment: Judgment normal.           Diagnostic Study Results     Labs -   No results found for this or any previous visit (from the past 12 hour(s)). Radiologic Studies -   No orders to display     CT Results  (Last 48 hours)    None        CXR Results  (Last 48 hours)    None            Medical Decision Making   I am the first provider for this patient. I reviewed the vital signs, available nursing notes, past medical history, past surgical history, family history and social history.     Vital Signs-Reviewed the patient's vital signs. Records Reviewed: Nursing Notes    Provider Notes (Medical Decision Making):   DDX gout flare arthritis of knee 78-year-old male presents with left knee pain with history of gout. Most likely gout flareup today        Disposition:  home    DISCHARGE NOTE:     9:43 PM  I have discussed with patient their diagnosis, treatment, and follow up plan. The patient agrees to follow up as outlined in discharge paperwork and also to return to the ED with any worsening. Carline Garcia NP        Follow-up Information     Follow up With Specialties Details Why Contact Info    Christiano Adams MD Internal Medicine In 1 week  [address]  [phone]            Discharge Medication List as of 11/8/2021  4:27 PM      START taking these medications    Details   diclofenac (Voltaren Arthritis Pain) 1 % gel Apply 2 g to affected area four (4) times daily. , Normal, Disp-1 Each, R-0      acetaminophen (Tylenol Arthritis Pain) 650 mg TbER Take 1 Tablet by mouth every eight (8) hours. , Normal, Disp-20 Tablet, R-0         CONTINUE these medications which have NOT CHANGED    Details   lactulose (CHRONULAC) 10 gram/15 mL solution Take 15 mL by mouth two (2) times a day., Normal, Disp-946 mL, R-11      naproxen (NAPROSYN) 500 mg tablet TAKE 1 TABLET BY MOUTH TWICE A DAY FOR GOUT PAIN, Historical Med      levETIRAcetam (KEPPRA) 500 mg tablet TAKE ONE TABLET BY MOUTH TWICE DAILY, Normal, Disp-60 Tab, R-11This prescription was filled on 4/15/2021. Any refills authorized will be placed on file. rosuvastatin (CRESTOR) 5 mg tablet Take 1 Tab by mouth nightly., Normal, Disp-90 Tab, R-3      thiamine HCL (B-1) 100 mg tablet TAKE ONE TABLET BY MOUTH EVERY DAY, Normal, Disp-30 Tab, R-11This prescription was filled on 1/27/2021. Any refills authorized will be placed on file.       traZODone (DESYREL) 100 mg tablet TAKE ONE TABLET BY MOUTH EVERY EVENING, Normal, Disp-30 Tab, R-11This prescription was filled on 1/27/2021. Any refills authorized will be placed on file. folic acid (FOLVITE) 1 mg tablet TAKE ONE TABLET BY MOUTH EVERY DAY, Normal, Disp-30 Tab, R-11This prescription was filled on 1/27/2021. Any refills authorized will be placed on file. albuterol (PROVENTIL HFA, VENTOLIN HFA, PROAIR HFA) 90 mcg/actuation inhaler INHALE 2 PUFFS BY MOUTH EVERY 4 HOURS AS NEEDED FOR WHEEZING OR SHORTNESS OF BREATH, Normal, Disp-8.5 g, R-11This prescription was filled on 1/27/2021. Any refills authorized will be placed on file. THERAPY M 9 mg iron-400 mcg tab tablet TAKE ONE TABLET BY MOUTH EVERY DAY, NormalThis prescription was filled on 8/30/2019. Any refills authorized will be placed on file. Disp-336 Tab, R-3      therapeutic multivitamin (THERAGRAN) tablet Take 1 Tab by mouth daily. , Normal, Disp-30 Tab, R-3             Procedures:  Procedures    Please note that this dictation was completed with Dragon, computer voice recognition software. Quite often unanticipated grammatical, syntax, homophones, and other interpretive errors are inadvertently transcribed by the computer software. Please disregard these errors. Additionally, please excuse any errors that have escaped final proofreading. Diagnosis     Clinical Impression:   1.  Acute gout of left knee, unspecified cause

## 2021-11-18 RX ORDER — ROSUVASTATIN CALCIUM 5 MG/1
5 TABLET, COATED ORAL
Qty: 90 TABLET | Refills: 3 | Status: SHIPPED | OUTPATIENT
Start: 2021-11-18 | End: 2022-02-01 | Stop reason: SDUPTHER

## 2021-11-18 RX ORDER — TRAZODONE HYDROCHLORIDE 100 MG/1
100 TABLET ORAL EVERY EVENING
Qty: 30 TABLET | Refills: 11 | Status: SHIPPED | OUTPATIENT
Start: 2021-11-18 | End: 2022-02-01 | Stop reason: SDUPTHER

## 2021-12-17 ENCOUNTER — OFFICE VISIT (OUTPATIENT)
Dept: INTERNAL MEDICINE CLINIC | Age: 66
End: 2021-12-17
Payer: MEDICAID

## 2021-12-17 VITALS
HEIGHT: 72 IN | RESPIRATION RATE: 20 BRPM | HEART RATE: 90 BPM | OXYGEN SATURATION: 98 % | WEIGHT: 129 LBS | TEMPERATURE: 97.8 F | SYSTOLIC BLOOD PRESSURE: 89 MMHG | BODY MASS INDEX: 17.47 KG/M2 | DIASTOLIC BLOOD PRESSURE: 60 MMHG

## 2021-12-17 DIAGNOSIS — Z13.31 SCREENING FOR DEPRESSION: ICD-10-CM

## 2021-12-17 DIAGNOSIS — F10.10 ETOH ABUSE: ICD-10-CM

## 2021-12-17 DIAGNOSIS — G40.909 SEIZURE DISORDER (HCC): ICD-10-CM

## 2021-12-17 DIAGNOSIS — Z00.00 MEDICARE ANNUAL WELLNESS VISIT, SUBSEQUENT: Primary | ICD-10-CM

## 2021-12-17 DIAGNOSIS — F10.10 ALCOHOL ABUSE: ICD-10-CM

## 2021-12-17 DIAGNOSIS — F32.A MILD DEPRESSION: ICD-10-CM

## 2021-12-17 DIAGNOSIS — R25.1 TREMORS OF NERVOUS SYSTEM: ICD-10-CM

## 2021-12-17 DIAGNOSIS — I10 BENIGN ESSENTIAL HTN: ICD-10-CM

## 2021-12-17 DIAGNOSIS — E78.5 DYSLIPIDEMIA: ICD-10-CM

## 2021-12-17 DIAGNOSIS — D64.9 ANEMIA, UNSPECIFIED TYPE: ICD-10-CM

## 2021-12-17 DIAGNOSIS — Z12.11 SCREEN FOR COLON CANCER: ICD-10-CM

## 2021-12-17 DIAGNOSIS — J45.909 MILD ASTHMA WITHOUT COMPLICATION, UNSPECIFIED WHETHER PERSISTENT: ICD-10-CM

## 2021-12-17 PROCEDURE — 99213 OFFICE O/P EST LOW 20 MIN: CPT | Performed by: INTERNAL MEDICINE

## 2021-12-17 PROCEDURE — G0439 PPPS, SUBSEQ VISIT: HCPCS | Performed by: INTERNAL MEDICINE

## 2021-12-17 NOTE — PROGRESS NOTES
1. Have you been to the ER, urgent care clinic since your last visit? Hospitalized since your last visit? Yes When: 11-8-21 Where: [facility] Reason for visit: knee issues    2. Have you seen or consulted any other health care providers outside of the [address] [place] [address] since your last visit? Include any pap smears or colon screening. No    Wants to discuss ED visit  This is the Subsequent Medicare Annual Wellness Exam, performed 12 months or more after the Initial AWV or the last Subsequent AWV    I have reviewed the patient's medical history in detail and updated the computerized patient record. Assessment/Plan   Education and counseling provided:  Are appropriate based on today's review and evaluation           Depression Risk Factor Screening     3 most recent PHQ Screens 10/12/2017   Little interest or pleasure in doing things Not at all   Feeling down, depressed, irritable, or hopeless Not at all   Total Score PHQ 2 0       Alcohol Risk Screen    Do you average more than 1 drink per night or more than 7 drinks a week: No    In the past three months have you have had more than 4 drinks containing alcohol on one occasion: No        Functional Ability and Level of Safety    Hearing: Hearing is good. Activities of Daily Living: The home contains: no safety equipment. Patient does total self care      Ambulation: with no difficulty     Fall Risk:  Fall Risk Assessment, last 12 mths 12/17/2021   Able to walk? Yes   Fall in past 12 months? 0   Do you feel unsteady?  0   Are you worried about falling 0   Number of falls in past 12 months -      Abuse Screen:  Patient is not abused       Cognitive Screening    Has your family/caregiver stated any concerns about your memory: no     Cognitive Screening: not necessary    Health Maintenance Due     Health Maintenance Due   Topic Date Due    DTaP/Tdap/Td series (1 - Tdap) Never done    Colorectal Cancer Screening Combo  Never done    Shingrix Vaccine Age 50> (1 of 2) Never done    Pneumococcal 65+ years (1 of 1 - PPSV23) Never done    COVID-19 Vaccine (3 - Booster for Moderna series) 10/23/2021    Medicare Yearly Exam  11/13/2021       Patient Care Team   Patient Care Team:  Javed Gannon MD as PCP - General (Internal Medicine)  Javed Gannon MD as PCP - [facility]  Autumn Mcdonald LPN as Nurse Navigator  Adeola Francois RN as Nurse Navigator    History     Patient Active Problem List   Diagnosis Code    Tremors of nervous system R25.1    ETOH abuse F10.10    Asthma J45.909    Benign essential HTN I10    Convulsions ([address] R56.9    Anemia, unspecified D64.9    Mild intermittent asthma without complication I98.50    Advance directive discussed with patient Z71.89    Advanced care planning/counseling discussion Z71.89    Mild depression ([address] F32.0    Hx of cholecystectomy Z90.49    Colonoscopy refused Z53.20    Seizure disorder ([address] G40.909    Alcohol abuse F10.10    Dyslipidemia E78.5     Past Medical History:   Diagnosis Date    Alcohol abuse     Anemia     Asthma     Colonoscopy refused 02/17/2020    Depression     Hx of cholecystectomy 09/12/2018    homar borrero md rt    Hypertension     Seizure disorder [facility])       Past Surgical History:   Procedure Laterality Date    HX COLONOSCOPY  at age 44     Current Outpatient Medications   Medication Sig Dispense Refill    rosuvastatin (CRESTOR) 5 mg tablet Take 1 Tablet by mouth nightly. 90 Tablet 3    traZODone (DESYREL) 100 mg tablet Take 1 Tablet by mouth every evening.  30 Tablet 11    thiamine HCL (B-1) 100 mg tablet TAKE ONE TABLET BY MOUTH EVERY DAY 30 Tablet 11    folic acid (FOLVITE) 1 mg tablet TAKE ONE TABLET BY MOUTH EVERY DAY 30 Tablet 11    albuterol (PROVENTIL HFA, VENTOLIN HFA, PROAIR HFA) 90 mcg/actuation inhaler INHALE 2 PUFFS BY MOUTH EVERY 4 HOURS AS NEEDED FOR WHEEZING OR SHORTNESS OF BREATH 8.5 g 11    diclofenac (Voltaren Arthritis Pain) 1 % gel Apply 2 g to affected area four (4) times daily. 1 Each 0    acetaminophen (Tylenol Arthritis Pain) 650 mg TbER Take 1 Tablet by mouth every eight (8) hours. 20 Tablet 0    lactulose (CHRONULAC) 10 gram/15 mL solution Take 15 mL by mouth two (2) times a day. 946 mL 11    naproxen (NAPROSYN) 500 mg tablet TAKE 1 TABLET BY MOUTH TWICE A DAY FOR GOUT PAIN      levETIRAcetam (KEPPRA) 500 mg tablet TAKE ONE TABLET BY MOUTH TWICE DAILY 60 Tab 11    THERAPY M 9 mg iron-400 mcg tab tablet TAKE ONE TABLET BY MOUTH EVERY  Tab 3    therapeutic multivitamin (THERAGRAN) tablet Take 1 Tab by mouth daily. 30 Tab 3     Allergies   Allergen Reactions    Other Medication Hives     Ketchup(cheap brands) causes hives       Family History   Problem Relation Age of Onset    Alcohol abuse Mother     Alcohol abuse Father     Hypertension Sister      Social History     Tobacco Use    Smoking status: Never Smoker    Smokeless tobacco: Never Used   Substance Use Topics    Alcohol use:  Yes     Alcohol/week: 2.0 standard drinks     Types: 2 Cans of beer per week     Comment: daily         Nannette Lora LPN

## 2021-12-17 NOTE — PATIENT INSTRUCTIONS
Medicare Wellness Visit, Male    The best way to live healthy is to have a lifestyle where you eat a well-balanced diet, exercise regularly, limit alcohol use, and quit all forms of tobacco/nicotine, if applicable. Regular preventive services are another way to keep healthy. Preventive services (vaccines, screening tests, monitoring & exams) can help personalize your care plan, which helps you manage your own care. Screening tests can find health problems at the earliest stages, when they are easiest to treat. [facility] follows the current, evidence-based guidelines published by the [facility] ([facility]) when recommending preventive services for our patients. Because we follow these guidelines, sometimes recommendations change over time as research supports it. (For example, a prostate screening blood test is no longer routinely recommended for men with no symptoms). Of course, you and your doctor may decide to screen more often for some diseases, based on your risk and co-morbidities (chronic disease you are already diagnosed with). Preventive services for you include:  - Medicare offers their members a free annual wellness visit, which is time for you and your primary care provider to discuss and plan for your preventive service needs. Take advantage of this benefit every year!  -All adults over age 72 should receive the recommended pneumonia vaccines. Current USPSTF guidelines recommend a series of two vaccines for the best pneumonia protection.   -All adults should have a flu vaccine yearly and tetanus vaccine every 10 years.  -All adults age 48 and older should receive the shingles vaccines (series of two vaccines).        -All adults age 38-68 who are overweight should have a diabetes screening test once every three years.   -Other screening tests & preventive services for persons with diabetes include: an eye exam to screen for diabetic retinopathy, a kidney function test, a foot exam, and stricter control over your cholesterol.   -Cardiovascular screening for adults with routine risk involves an electrocardiogram (ECG) at intervals determined by the provider.   -Colorectal cancer screening should be done for adults age 54-65 with no increased risk factors for colorectal cancer. There are a number of acceptable methods of screening for this type of cancer. Each test has its own benefits and drawbacks. Discuss with your provider what is most appropriate for you during your annual wellness visit. The different tests include: colonoscopy (considered the best screening method), a fecal occult blood test, a fecal DNA test, and sigmoidoscopy.  -All adults born between [facility][1] should be screened once for Hepatitis C.  -An Abdominal Aortic Aneurysm (AAA) Screening is recommended for men age 73-68 who has ever smoked in their lifetime.      Here is a list of your current Health Maintenance items (your personalized list of preventive services) with a due date:  Health Maintenance Due   Topic Date Due    DTaP/Tdap/Td  (1 - Tdap) Never done    Colorectal Screening  Never done    Shingles Vaccine (1 of 2) Never done    Pneumococcal Vaccine (1 of 1 - PPSV23) Never done    COVID-19 Vaccine (3 - Booster for Moderna series) 10/23/2021

## 2021-12-17 NOTE — PROGRESS NOTES
SPORTS MEDICINE AND PR[facility]ARE  Harinder Garza MD, [address] [address]  Phone:  [phone]  Fax: [phone]      Chief Complaint   Patient presents with    Annual Wellness Visit         SUBECTIVE:    Baldo Stephen is a 74 y.o. male *Patient returns today and since we last saw him, he was seen by Lady Joyce NP on 11/09/21, complaining of knee pain and requested Voltaren Gel. Other medical problems include dyslipidemia, alcohol abuse, seizure disorder, tremors, bronchial asthma, and is seen for evaluation. He completed his COVID series and his booster shot two weeks ago. He states the gel seems to be helping his knee and denies other complaints, and he will not stop drinking. **    Current Outpatient Medications   Medication Sig Dispense Refill    rosuvastatin (CRESTOR) 5 mg tablet Take 1 Tablet by mouth nightly. 90 Tablet 3    traZODone (DESYREL) 100 mg tablet Take 1 Tablet by mouth every evening. 30 Tablet 11    thiamine HCL (B-1) 100 mg tablet TAKE ONE TABLET BY MOUTH EVERY DAY 30 Tablet 11    folic acid (FOLVITE) 1 mg tablet TAKE ONE TABLET BY MOUTH EVERY DAY 30 Tablet 11    albuterol (PROVENTIL HFA, VENTOLIN HFA, PROAIR HFA) 90 mcg/actuation inhaler INHALE 2 PUFFS BY MOUTH EVERY 4 HOURS AS NEEDED FOR WHEEZING OR SHORTNESS OF BREATH 8.5 g 11    diclofenac (Voltaren Arthritis Pain) 1 % gel Apply 2 g to affected area four (4) times daily. 1 Each 0    acetaminophen (Tylenol Arthritis Pain) 650 mg TbER Take 1 Tablet by mouth every eight (8) hours. 20 Tablet 0    lactulose (CHRONULAC) 10 gram/15 mL solution Take 15 mL by mouth two (2) times a day.  946 mL 11    naproxen (NAPROSYN) 500 mg tablet TAKE 1 TABLET BY MOUTH TWICE A DAY FOR GOUT PAIN      levETIRAcetam (KEPPRA) 500 mg tablet TAKE ONE TABLET BY MOUTH TWICE DAILY 60 Tab 11    THERAPY M 9 mg iron-400 mcg tab tablet TAKE ONE TABLET BY MOUTH EVERY  Tab 3    therapeutic multivitamin (THERAGRAN) tablet Take 1 Tab by mouth daily. 27 Tab 3     Past Medical History:   Diagnosis Date    Alcohol abuse     Anemia     Asthma     Colonoscopy refused 2020    Depression     Hx of cholecystectomy 2018    homar borrero md rt    Hypertension     Seizure disorder [facility])      Past Surgical History:   Procedure Laterality Date    HX COLONOSCOPY  at age 44     Allergies   Allergen Reactions    Other Medication Hives     Ketchup(cheap brands) causes hives       REVIEW OF SYSTEMS:   No chest pain or shortness of breath. Social History     Socioeconomic History    Marital status: SINGLE    Number of children: 1   Occupational History     Comment: on disbiltiy arthritis and depression    Tobacco Use    Smoking status: Never Smoker    Smokeless tobacco: Never Used   Vaping Use    Vaping Use: Never used   Substance and Sexual Activity    Alcohol use: Yes     Alcohol/week: 2.0 standard drinks     Types: 2 Cans of beer per week     Comment: daily    Drug use: No     Comment: denies     Sexual activity: Not Currently     Partners: Female     Birth control/protection: Condom, None     Comment: sister is CHRISTEN Garland [phone]   Social History Narrative    Habits:  He states he only drinks one to two beers a day, however his sister cannot confirm that. She states he has gin plus a 20 ounce beer on a regular basis. The records reflect a long history of alcoholism. He denies cigarette abuse and drug abuse.         Social History:  The patient states with his brother with [employer] checking on both of them and making sure he takes his medications and gets his appointments. He completed the 12th grade. He is disabled due to depression since . He has a 39year old daughter and three grandchildren. The patient is single. 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   Mother  in her 62s of alcohol abuse. Eight siblings are alive and well. One sister  of alcohol and its complications           r  Family History   Problem Relation Age of Onset    Alcohol abuse Mother     Alcohol abuse Father     Hypertension Sister        OBJECTIVE:  Visit Vitals  BP (!) 89/60   Pulse 90   Temp 97.8 °F (36.6 °C) (Oral)   Resp 20   Ht 6' (1.829 m)   Wt 129 lb (58.5 kg)   SpO2 98%   BMI 17.50 kg/m²     ENT: perrla,  eom intact  NECK: supple. Thyroid normal  CHEST: clear to ascultation and percussion   HEART: regular rate and rhythm  ABD: soft, bowel sounds active  EXTREMITIES: no edema, pulse 1+     No visits with results within 3 Month(s) from this visit. Latest known visit with results is:   Office Visit on 09/10/2021   Component Date Value Ref Range Status    Sodium 09/10/2021 134* 136 - 145 mmol/L Final    Potassium 09/10/2021 4.1  3.5 - 5.1 mmol/L Final    Chloride 09/10/2021 98  97 - 108 mmol/L Final    CO2 09/10/2021 28  21 - 32 mmol/L Final    Anion gap 09/10/2021 8  5 - 15 mmol/L Final    Glucose 09/10/2021 90  65 - 100 mg/dL Final    BUN 09/10/2021 18  6 - 20 MG/DL Final    Creatinine 09/10/2021 1.14  0.70 - 1.30 MG/DL Final    BUN/Creatinine ratio 09/10/2021 16  12 - 20   Final    GFR est AA 09/10/2021 >60  >60 ml/min/1.73m2 Final    GFR est non-AA 09/10/2021 >60  >60 ml/min/1.73m2 Final    Comment: Estimated GFR is calculated using the IDMS-traceable Modification of Diet in  Renal Disease (MDRD) Study equation, reported for both  Americans  (GFRAA) and non- Americans (GFRNA), and normalized to 1.73m2 body  surface area. The physician must decide which value applies to the patient.       Calcium 09/10/2021 9.0  8.5 - 10.1 MG/DL Final    Phosphorus 09/10/2021 3.1  2.6 - 4.7 MG/DL Final    Albumin 09/10/2021 2.4* 3.5 - 5.0 g/dL Final    WBC 09/10/2021 10.0  4.1 - 11.1 K/uL Final    RBC 09/10/2021 3.16* 4.10 - 5.70 M/uL Final    HGB 09/10/2021 9.1* 12.1 - 17.0 g/dL Final    HCT 09/10/2021 29.5* 36.6 - 50.3 % Final    MCV 09/10/2021 93.4  80.0 - 99.0 FL Final    MCH 09/10/2021 28.8  26.0 - 34.0 PG Final    MCHC 09/10/2021 30.8  30.0 - 36.5 g/dL Final    RDW 09/10/2021 16.5* 11.5 - 14.5 % Final    PLATELET 65/11/0758 321* 150 - 400 K/uL Final    MPV 09/10/2021 10.4  8.9 - 12.9 FL Final    NRBC 09/10/2021 0.0  0  WBC Final    ABSOLUTE NRBC 09/10/2021 0.00  0.00 - 0.01 K/uL Final    NEUTROPHILS 09/10/2021 52  32 - 75 % Final    LYMPHOCYTES 09/10/2021 28  12 - 49 % Final    MONOCYTES 09/10/2021 17* 5 - 13 % Final    EOSINOPHILS 09/10/2021 1  0 - 7 % Final    BASOPHILS 09/10/2021 1  0 - 1 % Final    IMMATURE GRANULOCYTES 09/10/2021 1* 0.0 - 0.5 % Final    ABS. NEUTROPHILS 09/10/2021 5.3  1.8 - 8.0 K/UL Final    ABS. LYMPHOCYTES 09/10/2021 2.7  0.8 - 3.5 K/UL Final    ABS. MONOCYTES 09/10/2021 1.7* 0.0 - 1.0 K/UL Final    ABS. EOSINOPHILS 09/10/2021 0.1  0.0 - 0.4 K/UL Final    ABS. BASOPHILS 09/10/2021 0.1  0.0 - 0.1 K/UL Final    ABS. IMM. GRANS. 09/10/2021 0.1* 0.00 - 0.04 K/UL Final    DF 09/10/2021 AUTOMATED    Final          ASSESSMENT:  1. Medicare annual wellness visit, subsequent    2. Screening for depression    3. Dyslipidemia    4. Alcohol abuse    5. Seizure disorder ([facility]    6. Tremors of nervous system    7. ETOH abuse    8. Mild asthma without complication, unspecified whether persistent    9. Benign essential HTN    10. Anemia, unspecified type    11. Mild depression ([address]    12. Screen for colon cancer      Patient's cholesterol is controlled with diet alone with his last LDL at goal at 59.2. He continues to abuse alcohol in spite of the fact that we encouraged him to stop. No recent seizures since we last saw him. No evidenced of bronchospasm on exam.    BP control is at goal.    He has extremely poor personal hygiene today. He stays with family and goes to  during the day.     He finally agrees to have a colon exam and we give him a referral.  He will be back to see us in six months, sooner if any problems. I have discussed the diagnosis with the patient and the intended plan as seen in the  orders above. The patient understands and agees with the plan. The patient has   received an after visit summary and questions were answered concerning  future plans  Patient labs and/or xrays were reviewed  Past records were reviewed. PLAN:  .  Orders Placed This Encounter    ANNUAL DEPRESSION SCREEN 8-15 MIN    REFERRAL TO GASTROENTEROLOGY       Follow-up and Dispositions    · Return in about 6 months (around 6/17/2022). ATTENTION:   This medical record was transcribed using an electronic medical records system. Although proofread, it may and can contain electronic and spelling errors. Other human spelling and other errors may be present. Corrections may be executed at a later time. Please feel free to contact us for any clarifications as needed.

## 2022-02-01 RX ORDER — ROSUVASTATIN CALCIUM 5 MG/1
5 TABLET, COATED ORAL
Qty: 90 TABLET | Refills: 3 | Status: SHIPPED | OUTPATIENT
Start: 2022-02-01

## 2022-02-01 RX ORDER — TRAZODONE HYDROCHLORIDE 100 MG/1
100 TABLET ORAL EVERY EVENING
Qty: 30 TABLET | Refills: 11 | Status: SHIPPED | OUTPATIENT
Start: 2022-02-01

## 2022-02-03 RX ORDER — LEVETIRACETAM 500 MG/1
500 TABLET ORAL 2 TIMES DAILY
Qty: 60 TABLET | Refills: 11 | Status: SHIPPED | OUTPATIENT
Start: 2022-02-03

## 2022-02-03 RX ORDER — AMLODIPINE BESYLATE 5 MG/1
5 TABLET ORAL DAILY
Qty: 30 TABLET | Refills: 11 | Status: SHIPPED | OUTPATIENT
Start: 2022-02-03

## 2022-03-19 PROBLEM — Z71.89 ADVANCED CARE PLANNING/COUNSELING DISCUSSION: Status: ACTIVE | Noted: 2017-02-06

## 2022-03-19 PROBLEM — E78.5 DYSLIPIDEMIA: Status: ACTIVE | Noted: 2021-06-04

## 2022-03-19 PROBLEM — F32.A MILD DEPRESSION: Status: ACTIVE | Noted: 2018-11-01

## 2022-03-19 PROBLEM — Z90.49 HX OF CHOLECYSTECTOMY: Status: ACTIVE | Noted: 2018-09-12

## 2022-03-20 PROBLEM — Z53.20 COLONOSCOPY REFUSED: Status: ACTIVE | Noted: 2020-02-17

## 2023-05-21 RX ORDER — NAPROXEN 500 MG/1
TABLET ORAL
COMMUNITY
Start: 2021-08-29

## 2023-05-21 RX ORDER — LACTULOSE 10 G/15ML
15 SOLUTION ORAL 2 TIMES DAILY
COMMUNITY
Start: 2021-09-08

## 2023-05-21 RX ORDER — LEVETIRACETAM 500 MG/1
500 TABLET ORAL 2 TIMES DAILY
COMMUNITY
Start: 2022-02-03

## 2023-05-21 RX ORDER — ALBUTEROL SULFATE 90 UG/1
AEROSOL, METERED RESPIRATORY (INHALATION)
COMMUNITY
Start: 2021-11-17

## 2023-05-21 RX ORDER — LANOLIN ALCOHOL/MO/W.PET/CERES
1 CREAM (GRAM) TOPICAL DAILY
COMMUNITY
Start: 2021-11-17

## 2023-05-21 RX ORDER — FOLIC ACID 1 MG/1
1 TABLET ORAL DAILY
COMMUNITY
Start: 2021-11-17

## 2023-05-21 RX ORDER — ROSUVASTATIN CALCIUM 5 MG/1
1 TABLET, COATED ORAL NIGHTLY
COMMUNITY
Start: 2022-02-01

## 2023-05-21 RX ORDER — AMLODIPINE BESYLATE 5 MG/1
5 TABLET ORAL DAILY
COMMUNITY
Start: 2022-02-03

## 2023-05-21 RX ORDER — SENNOSIDES 8.6 MG
650 CAPSULE ORAL EVERY 8 HOURS
COMMUNITY
Start: 2021-11-08

## 2023-05-21 RX ORDER — TRAZODONE HYDROCHLORIDE 100 MG/1
100 TABLET ORAL EVERY EVENING
COMMUNITY
Start: 2022-02-01

## 2024-01-19 NOTE — ED NOTES
Discharge instructions were given to the patient by Adriano Eric RN. The patient left the Emergency Department ambulatory, alert and oriented and in no acute distress with 2 prescriptions. The patient was encouraged to call or return to the ED for worsening issues or problems and was encouraged to schedule a follow up appointment for continuing care. The patient verbalized understanding of discharge instructions and prescriptions, all questions were answered. The patient has no further concerns at this time. No